# Patient Record
Sex: FEMALE | Employment: FULL TIME | URBAN - METROPOLITAN AREA
[De-identification: names, ages, dates, MRNs, and addresses within clinical notes are randomized per-mention and may not be internally consistent; named-entity substitution may affect disease eponyms.]

---

## 2017-01-30 ENCOUNTER — TELEPHONE (OUTPATIENT)
Dept: FAMILY MEDICINE CLINIC | Facility: CLINIC | Age: 22
End: 2017-01-30

## 2017-01-30 NOTE — TELEPHONE ENCOUNTER
Patient has reported that she attempted refill with her pharmacy, but they are unwilling to submit request on her behalf because med has no refills.     Current Outpatient Prescriptions:  Sertraline HCl 50 MG Oral Tab Take 1.5 tablets (75 mg total) by mouth

## 2017-02-17 NOTE — PROGRESS NOTES
Patient ID: Kavita Garcia is a 24year old female. HPI  Patient presents with: Anxiety  Depression    She states she is doing so much better with the 75 mg of sertraline. 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   No crying spells or suicidal ideat (around 8/17/2017).         Yair Doherty, DO  2/17/2017

## 2017-07-03 ENCOUNTER — OFFICE VISIT (OUTPATIENT)
Dept: FAMILY MEDICINE CLINIC | Facility: CLINIC | Age: 22
End: 2017-07-03

## 2017-07-03 VITALS
TEMPERATURE: 100 F | HEIGHT: 66 IN | HEART RATE: 88 BPM | SYSTOLIC BLOOD PRESSURE: 115 MMHG | WEIGHT: 132.19 LBS | RESPIRATION RATE: 12 BRPM | DIASTOLIC BLOOD PRESSURE: 75 MMHG | BODY MASS INDEX: 21.24 KG/M2

## 2017-07-03 DIAGNOSIS — N76.0 BACTERIAL VAGINOSIS: Primary | ICD-10-CM

## 2017-07-03 DIAGNOSIS — B96.89 BACTERIAL VAGINOSIS: Primary | ICD-10-CM

## 2017-07-03 PROCEDURE — 99213 OFFICE O/P EST LOW 20 MIN: CPT | Performed by: FAMILY MEDICINE

## 2017-07-03 PROCEDURE — 99212 OFFICE O/P EST SF 10 MIN: CPT | Performed by: FAMILY MEDICINE

## 2017-07-03 RX ORDER — SERTRALINE HYDROCHLORIDE 25 MG/1
50 TABLET, FILM COATED ORAL
COMMUNITY
End: 2018-01-22

## 2017-07-03 RX ORDER — METRONIDAZOLE 500 MG/1
500 TABLET ORAL 2 TIMES DAILY
Qty: 14 TABLET | Refills: 0 | Status: SHIPPED | OUTPATIENT
Start: 2017-07-03 | End: 2017-07-10

## 2017-07-03 NOTE — PROGRESS NOTES
Patient ID: Alba Pollock is a 24year old female. HPI  Patient presents with:  Vaginal Discharge: white and foul odor     She has had a history of bacterial vaginosis. She usually sees her gynecologist but they are closed today.   The last time she Constitutional: Patient is oriented to person, place, and time. Patient appears well-developed and well-nourished. HENT:   Mouth/Throat: Mucous membranes are normal.   Neck: Normal range of motion. Neck supple.  No thyromegaly   Cardiovascular: Normal r

## 2017-08-08 NOTE — TELEPHONE ENCOUNTER
Need refill on Sertraline HCl . Current Outpatient Prescriptions:  Sertraline HCl 25 MG Oral Tab Take 50 mg by mouth. Disp:  Rfl:    Sertraline HCl 50 MG Oral Tab Take 50 mg by mouth.  Disp:  Rfl:

## 2017-08-14 NOTE — TELEPHONE ENCOUNTER
Reviewed refill request with pharmacist, states pt is taking Sertraline 50 mg 1.5 tablets daily    Med listed as pt reported in EMR    Please advise    Refill Protocol Appointment Criteria  · Appointment scheduled in the past 6 months or in the next 3 judith

## 2018-01-11 NOTE — TELEPHONE ENCOUNTER
SUSAN- Please call and schedule an appointment for patient with Dr. Michelle Nava. See message below.

## 2018-01-22 NOTE — PROGRESS NOTES
Patient ID: Vic Cervantes is a 25year old female. HPI  Patient presents with:   Anxiety      She was doing well on 75 mg of sertraline in the past but states recently she even bumped it down to 50 mg since January 1, 2018 and is thinking of tapering currently breastfeeding. Physical Exam   Constitutional: Patient is oriented to person, place, and time. Patient appears well-developed and well-nourished. No distress. HENT:   Head: Normocephalic and atraumatic. Neck: Normal range of motion.  Neck sup

## 2018-05-07 ENCOUNTER — NURSE TRIAGE (OUTPATIENT)
Dept: OTHER | Age: 23
End: 2018-05-07

## 2018-05-07 NOTE — TELEPHONE ENCOUNTER
Action Requested: Summary for Provider     []  Critical Lab, Recommendations Needed  [] Need Additional Advice  []   FYI    []   Need Orders  [] Need Medications Sent to Pharmacy  []  Other     SUMMARY: Patient requesting appt tomorrow with VS only for \"b

## 2018-05-08 ENCOUNTER — OFFICE VISIT (OUTPATIENT)
Dept: FAMILY MEDICINE CLINIC | Facility: CLINIC | Age: 23
End: 2018-05-08

## 2018-05-08 VITALS
TEMPERATURE: 98 F | RESPIRATION RATE: 12 BRPM | WEIGHT: 134.63 LBS | BODY MASS INDEX: 21.64 KG/M2 | SYSTOLIC BLOOD PRESSURE: 120 MMHG | HEIGHT: 66 IN | HEART RATE: 83 BPM | DIASTOLIC BLOOD PRESSURE: 79 MMHG

## 2018-05-08 DIAGNOSIS — R23.8 GENERALIZED SKIN PAPULES: Primary | ICD-10-CM

## 2018-05-08 DIAGNOSIS — Z23 NEED FOR VACCINATION: ICD-10-CM

## 2018-05-08 PROCEDURE — 99213 OFFICE O/P EST LOW 20 MIN: CPT | Performed by: FAMILY MEDICINE

## 2018-05-08 PROCEDURE — 90471 IMMUNIZATION ADMIN: CPT | Performed by: FAMILY MEDICINE

## 2018-05-08 PROCEDURE — 90715 TDAP VACCINE 7 YRS/> IM: CPT | Performed by: FAMILY MEDICINE

## 2018-05-08 NOTE — PROGRESS NOTES
Patient ID: Tila Hardin is a 25year old female.     HPI  Patient presents with:  Bump: bilateral hands     Action Requested: Summary for Provider     []  Critical Lab, Recommendations Needed  [] Need Additional Advice  []   FYI    []   Need Orders  [] Encounters:  05/08/18 : 134 lb 9.6 oz (61.1 kg)  01/22/18 : 137 lb 12.8 oz (62.5 kg)  07/03/17 : 132 lb 3.2 oz (60 kg)  02/17/17 : 130 lb (59 kg)  12/29/16 : 131 lb (59.4 kg)  03/21/16 : 129 lb (58.5 kg)      Body mass index is 21.73 kg/m².     BP Readings INTERNAL  This does not look like molluscum contagiosum. It does not really bother her but we will try some triamcinolone to see if it helps.   I told her if it is not much better in 10-14 days she may want to see the dermatologist and I went ahead and did

## 2018-08-17 ENCOUNTER — HOSPITAL (OUTPATIENT)
Dept: OTHER | Age: 23
End: 2018-08-17
Attending: OBSTETRICS & GYNECOLOGY

## 2018-08-23 ENCOUNTER — HOSPITAL (OUTPATIENT)
Dept: OTHER | Age: 23
End: 2018-08-23
Attending: OBSTETRICS & GYNECOLOGY

## 2018-08-23 LAB — HCG SERPL-ACNC: 62 MUNIT/ML

## 2018-09-24 ENCOUNTER — TELEPHONE (OUTPATIENT)
Dept: OTHER | Age: 23
End: 2018-09-24

## 2018-09-24 NOTE — TELEPHONE ENCOUNTER
Spoke with patient--requesting name and # to dermatologist referred to her by VS.    Relayed information below--she states her insurance is currently [employer] PPO, but will be changing to [address].  Relayed to call # below for appt and to call back if appt is af Electronically Signed By: Akila Hughes DO    Order Date: May 8, 2018 at 10:17 AM

## 2018-11-05 ENCOUNTER — OFFICE VISIT (OUTPATIENT)
Dept: DERMATOLOGY CLINIC | Facility: CLINIC | Age: 23
End: 2018-11-05
Payer: COMMERCIAL

## 2018-11-05 DIAGNOSIS — L70.0 ACNE VULGARIS: ICD-10-CM

## 2018-11-05 DIAGNOSIS — B36.0 TINEA VERSICOLOR: Primary | ICD-10-CM

## 2018-11-05 PROCEDURE — 99212 OFFICE O/P EST SF 10 MIN: CPT | Performed by: DERMATOLOGY

## 2018-11-05 PROCEDURE — 99202 OFFICE O/P NEW SF 15 MIN: CPT | Performed by: DERMATOLOGY

## 2018-11-05 RX ORDER — CLOTRIMAZOLE 1 %
CREAM (GRAM) TOPICAL
Qty: 60 G | Refills: 3 | Status: ON HOLD | OUTPATIENT
Start: 2018-11-05 | End: 2019-06-15

## 2018-11-05 RX ORDER — CLINDAMYCIN PHOSPHATE 10 MG/G
1 GEL TOPICAL 2 TIMES DAILY
Qty: 60 G | Refills: 12 | Status: ON HOLD | OUTPATIENT
Start: 2018-11-05 | End: 2019-06-15

## 2018-11-05 RX ORDER — AZELAIC ACID 0.15 G/G
GEL TOPICAL
Qty: 50 G | Refills: 6 | Status: ON HOLD | OUTPATIENT
Start: 2018-11-05 | End: 2019-06-15

## 2018-11-18 NOTE — PROGRESS NOTES
Malcolm Tapia is a 21year old female. Patient presents with:  Acne: New pt presenting with acne to face, shoulder, and back. Pt states has previously used [employer] and spironolactone for treatment. Pt notes she is pregnant.              Patient has n (irritable bowel syndrome)    • Lactose intolerance      Past Surgical History:   Procedure Laterality Date   • OTHER SURGICAL HISTORY     • SIGMOIDOSCOPY, FLEXIBLE; W/DILATION, BALLOON, 1/> STRICTURES  2014   • UPPER GI ENDOSCOPY PERFORMED  2014     [facility] Sister    • Breast Cancer Other         Grandmother                      HPI :      Patient presents with:  Acne: New pt presenting with acne to face, shoulder, and back. Pt states has previously used [employer] and spironolactone for treatment.  Pt notes she Reassurance regarding benign skin lesions. Signs and symptoms of skin cancer, ABCDE's of melanoma briefly reviewed. Sunscreen use, sun protection, encouraged. Followup as noted in rtc or p.r.n.     No orders of the defined types were placed in this encount

## 2018-11-20 ENCOUNTER — LAB REQUISITION (OUTPATIENT)
Dept: LAB | Facility: HOSPITAL | Age: 23
End: 2018-11-20
Payer: COMMERCIAL

## 2018-11-20 DIAGNOSIS — Z34.91 ENCOUNTER FOR SUPERVISION OF NORMAL PREGNANCY IN FIRST TRIMESTER: ICD-10-CM

## 2018-11-20 PROCEDURE — 86780 TREPONEMA PALLIDUM: CPT | Performed by: OBSTETRICS & GYNECOLOGY

## 2018-11-20 PROCEDURE — 86762 RUBELLA ANTIBODY: CPT | Performed by: OBSTETRICS & GYNECOLOGY

## 2018-11-20 PROCEDURE — 87340 HEPATITIS B SURFACE AG IA: CPT | Performed by: OBSTETRICS & GYNECOLOGY

## 2018-11-20 PROCEDURE — 86850 RBC ANTIBODY SCREEN: CPT | Performed by: OBSTETRICS & GYNECOLOGY

## 2018-11-20 PROCEDURE — 87389 HIV-1 AG W/HIV-1&-2 AB AG IA: CPT | Performed by: OBSTETRICS & GYNECOLOGY

## 2018-11-20 PROCEDURE — 85025 COMPLETE CBC W/AUTO DIFF WBC: CPT | Performed by: OBSTETRICS & GYNECOLOGY

## 2018-11-20 PROCEDURE — 86900 BLOOD TYPING SEROLOGIC ABO: CPT | Performed by: OBSTETRICS & GYNECOLOGY

## 2018-11-20 PROCEDURE — 86901 BLOOD TYPING SEROLOGIC RH(D): CPT | Performed by: OBSTETRICS & GYNECOLOGY

## 2018-12-17 ENCOUNTER — LAB REQUISITION (OUTPATIENT)
Dept: LAB | Facility: HOSPITAL | Age: 23
End: 2018-12-17
Payer: COMMERCIAL

## 2018-12-17 DIAGNOSIS — Z34.01 ENCOUNTER FOR SUPERVISION OF NORMAL FIRST PREGNANCY IN FIRST TRIMESTER: ICD-10-CM

## 2018-12-17 PROCEDURE — 81220 CFTR GENE COM VARIANTS: CPT | Performed by: OBSTETRICS & GYNECOLOGY

## 2018-12-17 PROCEDURE — 81401 MOPATH PROCEDURE LEVEL 2: CPT | Performed by: OBSTETRICS & GYNECOLOGY

## 2018-12-26 ENCOUNTER — LAB REQUISITION (OUTPATIENT)
Dept: LAB | Facility: HOSPITAL | Age: 23
End: 2018-12-26
Payer: COMMERCIAL

## 2018-12-26 DIAGNOSIS — Z36.9 ENCOUNTER FOR ANTENATAL SCREENING: ICD-10-CM

## 2018-12-26 PROCEDURE — 87086 URINE CULTURE/COLONY COUNT: CPT | Performed by: OBSTETRICS & GYNECOLOGY

## 2018-12-26 PROCEDURE — 81001 URINALYSIS AUTO W/SCOPE: CPT | Performed by: OBSTETRICS & GYNECOLOGY

## 2019-01-05 ENCOUNTER — NURSE TRIAGE (OUTPATIENT)
Dept: FAMILY MEDICINE CLINIC | Facility: CLINIC | Age: 24
End: 2019-01-05

## 2019-01-05 NOTE — TELEPHONE ENCOUNTER
Action Requested: Summary for Provider     []  Critical Lab, Recommendations Needed  [] Need Additional Advice  []   FYI    []   Need Orders  [] Need Medications Sent to Pharmacy  []  Other     SUMMARY: Appt scheduled for Monday 1/7/19 4:30pm with belkis Carlos

## 2019-01-07 ENCOUNTER — OFFICE VISIT (OUTPATIENT)
Dept: FAMILY MEDICINE CLINIC | Facility: CLINIC | Age: 24
End: 2019-01-07

## 2019-01-07 VITALS
DIASTOLIC BLOOD PRESSURE: 68 MMHG | BODY MASS INDEX: 21.86 KG/M2 | TEMPERATURE: 99 F | HEIGHT: 66 IN | WEIGHT: 136 LBS | SYSTOLIC BLOOD PRESSURE: 122 MMHG | HEART RATE: 92 BPM

## 2019-01-07 DIAGNOSIS — Z3A.15 15 WEEKS GESTATION OF PREGNANCY: ICD-10-CM

## 2019-01-07 DIAGNOSIS — J06.9 UPPER RESPIRATORY TRACT INFECTION, UNSPECIFIED TYPE: Primary | ICD-10-CM

## 2019-01-07 PROCEDURE — 99213 OFFICE O/P EST LOW 20 MIN: CPT | Performed by: FAMILY MEDICINE

## 2019-01-07 PROCEDURE — 99212 OFFICE O/P EST SF 10 MIN: CPT | Performed by: FAMILY MEDICINE

## 2019-01-07 NOTE — PATIENT INSTRUCTIONS
Get over-the-counter Afrin nasal spray. Do two sprays in each nostril twice daily around breakfast and dinnertime. Avoid snorting it down the back of her throat. Do not take for more than 4 days.

## 2019-01-17 ENCOUNTER — TELEPHONE (OUTPATIENT)
Dept: FAMILY MEDICINE CLINIC | Facility: CLINIC | Age: 24
End: 2019-01-17

## 2019-01-17 RX ORDER — AMOXICILLIN 875 MG/1
875 TABLET, COATED ORAL 2 TIMES DAILY
Qty: 20 TABLET | Refills: 0 | Status: SHIPPED | OUTPATIENT
Start: 2019-01-17 | End: 2019-01-27

## 2019-01-17 NOTE — TELEPHONE ENCOUNTER
Patient reports seen in [address] [place] [address] Rd 1/7/19 with Dr Isadora Schmitz, with sinus symptoms, reports symptoms have not improved, continues to have nasal congestion, phlegm and coughing. Using Afrin with no improvement. Denied fever, sob/wheezing, no other symptoms at this time.  Laretta Runner

## 2019-01-18 NOTE — TELEPHONE ENCOUNTER
Let her know I sent in amoxicillin. Take it twice daily for 10 days which works wonderfully for sinus infections.

## 2019-01-24 ENCOUNTER — LAB REQUISITION (OUTPATIENT)
Dept: LAB | Facility: HOSPITAL | Age: 24
End: 2019-01-24
Payer: COMMERCIAL

## 2019-01-24 DIAGNOSIS — Z36.9 ENCOUNTER FOR ANTENATAL SCREENING: ICD-10-CM

## 2019-01-24 PROCEDURE — 82105 ALPHA-FETOPROTEIN SERUM: CPT | Performed by: OBSTETRICS & GYNECOLOGY

## 2019-01-26 LAB
FAMILY HX NEURAL TUBE DEFECT: NO
INSULIN REQ MATERNAL DIABETES: NO
MATERNAL AGE OF DELIVERY: 23.9 YR
MOM FOR AFP: 1.42
PATIENT'S AFP: 57 NG/ML

## 2019-03-18 ENCOUNTER — LAB REQUISITION (OUTPATIENT)
Dept: LAB | Facility: HOSPITAL | Age: 24
End: 2019-03-18
Payer: COMMERCIAL

## 2019-03-18 DIAGNOSIS — IMO0002 OTHER SPECIFIED DISEASES AND CONDITIONS COMPLICATING PREGNANCY, CHILDBIRTH AND THE PUERPERIUM: ICD-10-CM

## 2019-03-18 DIAGNOSIS — O26.899 OTHER SPECIFIED PREGNANCY RELATED CONDITIONS, UNSPECIFIED TRIMESTER: ICD-10-CM

## 2019-03-18 LAB
BACTERIA UR QL AUTO: NEGATIVE /HPF
BILIRUB UR QL: NEGATIVE
CLARITY UR: CLEAR
COLOR UR: YELLOW
GLUCOSE UR-MCNC: NEGATIVE MG/DL
HGB UR QL STRIP.AUTO: NEGATIVE
LEUKOCYTE ESTERASE UR QL STRIP.AUTO: NEGATIVE
NITRITE UR QL STRIP.AUTO: NEGATIVE
PH UR: 5 [PH] (ref 5–8)
PROT UR-MCNC: NEGATIVE MG/DL
RBC #/AREA URNS AUTO: <1 /HPF
SP GR UR STRIP: 1.03 (ref 1–1.03)
UROBILINOGEN UR STRIP-ACNC: <2
VIT C UR-MCNC: 40 MG/DL
WBC #/AREA URNS AUTO: 0 /HPF

## 2019-03-18 PROCEDURE — 87086 URINE CULTURE/COLONY COUNT: CPT | Performed by: OBSTETRICS & GYNECOLOGY

## 2019-03-18 PROCEDURE — 81003 URINALYSIS AUTO W/O SCOPE: CPT | Performed by: OBSTETRICS & GYNECOLOGY

## 2019-04-08 ENCOUNTER — LAB REQUISITION (OUTPATIENT)
Dept: LAB | Facility: HOSPITAL | Age: 24
End: 2019-04-08
Payer: COMMERCIAL

## 2019-04-08 DIAGNOSIS — Z13.0 ENCOUNTER FOR SCREENING FOR DISEASES OF THE BLOOD AND BLOOD-FORMING ORGANS AND CERTAIN DISORDERS INVOLVING THE IMMUNE MECHANISM: ICD-10-CM

## 2019-04-08 DIAGNOSIS — Z13.1 ENCOUNTER FOR SCREENING FOR DIABETES MELLITUS: ICD-10-CM

## 2019-04-08 PROCEDURE — 80053 COMPREHEN METABOLIC PANEL: CPT | Performed by: OBSTETRICS & GYNECOLOGY

## 2019-04-08 PROCEDURE — 85025 COMPLETE CBC W/AUTO DIFF WBC: CPT | Performed by: OBSTETRICS & GYNECOLOGY

## 2019-04-08 PROCEDURE — 82950 GLUCOSE TEST: CPT | Performed by: OBSTETRICS & GYNECOLOGY

## 2019-04-17 ENCOUNTER — HOSPITAL ENCOUNTER (OUTPATIENT)
Dept: ULTRASOUND IMAGING | Age: 24
Discharge: HOME | End: 2019-04-17
Attending: OBSTETRICS & GYNECOLOGY
Payer: COMMERCIAL

## 2019-04-17 DIAGNOSIS — R10.11 RUQ ABDOMINAL PAIN: ICD-10-CM

## 2019-04-17 PROCEDURE — 76705 ECHO EXAM OF ABDOMEN: CPT | Performed by: OBSTETRICS & GYNECOLOGY

## 2019-06-05 ENCOUNTER — LAB REQUISITION (OUTPATIENT)
Dept: LAB | Facility: HOSPITAL | Age: 24
End: 2019-06-05
Payer: COMMERCIAL

## 2019-06-05 DIAGNOSIS — Z36.85 ENCOUNTER FOR ANTENATAL SCREENING FOR STREPTOCOCCUS B: ICD-10-CM

## 2019-06-05 DIAGNOSIS — Z36.9 ENCOUNTER FOR ANTENATAL SCREENING: ICD-10-CM

## 2019-06-05 PROCEDURE — 86780 TREPONEMA PALLIDUM: CPT | Performed by: OBSTETRICS & GYNECOLOGY

## 2019-06-05 PROCEDURE — 87081 CULTURE SCREEN ONLY: CPT | Performed by: OBSTETRICS & GYNECOLOGY

## 2019-06-05 PROCEDURE — 87653 STREP B DNA AMP PROBE: CPT | Performed by: OBSTETRICS & GYNECOLOGY

## 2019-06-05 PROCEDURE — 87389 HIV-1 AG W/HIV-1&-2 AB AG IA: CPT | Performed by: OBSTETRICS & GYNECOLOGY

## 2019-06-13 ENCOUNTER — ANESTHESIA (OUTPATIENT)
Dept: OBGYN UNIT | Facility: HOSPITAL | Age: 24
End: 2019-06-13
Payer: COMMERCIAL

## 2019-06-13 ENCOUNTER — HOSPITAL ENCOUNTER (INPATIENT)
Facility: HOSPITAL | Age: 24
LOS: 2 days | Discharge: HOME | End: 2019-06-15
Attending: OBSTETRICS & GYNECOLOGY | Admitting: OBSTETRICS & GYNECOLOGY
Payer: COMMERCIAL

## 2019-06-13 ENCOUNTER — ANESTHESIA EVENT (OUTPATIENT)
Dept: OBGYN UNIT | Facility: HOSPITAL | Age: 24
End: 2019-06-13
Payer: COMMERCIAL

## 2019-06-13 PROBLEM — Z86.59 HISTORY OF ANXIETY: Status: ACTIVE | Noted: 2019-06-13

## 2019-06-13 PROBLEM — O26.93 PREGNANCY RELATED CONDITION IN THIRD TRIMESTER: Status: ACTIVE | Noted: 2019-06-13

## 2019-06-13 PROBLEM — O26.93 PREGNANCY RELATED CONDITION IN THIRD TRIMESTER: Status: RESOLVED | Noted: 2019-06-13 | Resolved: 2019-06-13

## 2019-06-13 PROCEDURE — 86900 BLOOD TYPING SEROLOGIC ABO: CPT | Performed by: OBSTETRICS & GYNECOLOGY

## 2019-06-13 PROCEDURE — 85027 COMPLETE CBC AUTOMATED: CPT | Performed by: OBSTETRICS & GYNECOLOGY

## 2019-06-13 PROCEDURE — 0HQ9XZZ REPAIR PERINEUM SKIN, EXTERNAL APPROACH: ICD-10-PCS | Performed by: OBSTETRICS & GYNECOLOGY

## 2019-06-13 PROCEDURE — 86850 RBC ANTIBODY SCREEN: CPT | Performed by: OBSTETRICS & GYNECOLOGY

## 2019-06-13 PROCEDURE — 86901 BLOOD TYPING SEROLOGIC RH(D): CPT | Performed by: OBSTETRICS & GYNECOLOGY

## 2019-06-13 RX ORDER — ONDANSETRON 2 MG/ML
4 INJECTION INTRAMUSCULAR; INTRAVENOUS EVERY 6 HOURS PRN
Status: DISCONTINUED | OUTPATIENT
Start: 2019-06-13 | End: 2019-06-15

## 2019-06-13 RX ORDER — BUPIVACAINE HYDROCHLORIDE 2.5 MG/ML
INJECTION, SOLUTION EPIDURAL; INFILTRATION; INTRACAUDAL AS NEEDED
Status: DISCONTINUED | OUTPATIENT
Start: 2019-06-13 | End: 2019-06-13 | Stop reason: SURG

## 2019-06-13 RX ORDER — BUPIVACAINE HYDROCHLORIDE 2.5 MG/ML
INJECTION, SOLUTION EPIDURAL; INFILTRATION; INTRACAUDAL
Status: DISPENSED
Start: 2019-06-13 | End: 2019-06-13

## 2019-06-13 RX ORDER — LIDOCAINE HYDROCHLORIDE 10 MG/ML
INJECTION, SOLUTION EPIDURAL; INFILTRATION; INTRACAUDAL; PERINEURAL AS NEEDED
Status: DISCONTINUED | OUTPATIENT
Start: 2019-06-13 | End: 2019-06-13 | Stop reason: SURG

## 2019-06-13 RX ORDER — DIAPER,BRIEF,INFANT-TODD,DISP
1 EACH MISCELLANEOUS EVERY 6 HOURS PRN
Status: DISCONTINUED | OUTPATIENT
Start: 2019-06-13 | End: 2019-06-15

## 2019-06-13 RX ORDER — EPHEDRINE SULFATE/0.9% NACL/PF 25 MG/5 ML
SYRINGE (ML) INTRAVENOUS
Status: COMPLETED
Start: 2019-06-13 | End: 2019-06-13

## 2019-06-13 RX ORDER — BUPIVACAINE HYDROCHLORIDE 2.5 MG/ML
30 INJECTION, SOLUTION EPIDURAL; INFILTRATION; INTRACAUDAL ONCE
Status: DISCONTINUED | OUTPATIENT
Start: 2019-06-13 | End: 2019-06-13

## 2019-06-13 RX ORDER — AMMONIA INHALANTS 0.04 G/.3ML
0.3 INHALANT RESPIRATORY (INHALATION) AS NEEDED
Status: DISCONTINUED | OUTPATIENT
Start: 2019-06-13 | End: 2019-06-15

## 2019-06-13 RX ORDER — IBUPROFEN 600 MG/1
600 TABLET ORAL EVERY 4 HOURS PRN
Status: DISCONTINUED | OUTPATIENT
Start: 2019-06-13 | End: 2019-06-15

## 2019-06-13 RX ORDER — IBUPROFEN 400 MG/1
400 TABLET ORAL EVERY 4 HOURS PRN
Status: DISCONTINUED | OUTPATIENT
Start: 2019-06-13 | End: 2019-06-15

## 2019-06-13 RX ORDER — SODIUM CHLORIDE, SODIUM LACTATE, POTASSIUM CHLORIDE, CALCIUM CHLORIDE 600; 310; 30; 20 MG/100ML; MG/100ML; MG/100ML; MG/100ML
INJECTION, SOLUTION INTRAVENOUS CONTINUOUS
Status: DISCONTINUED | OUTPATIENT
Start: 2019-06-13 | End: 2019-06-13

## 2019-06-13 RX ORDER — CHOLECALCIFEROL (VITAMIN D3) 25 MCG
1 TABLET,CHEWABLE ORAL DAILY
Status: DISCONTINUED | OUTPATIENT
Start: 2019-06-13 | End: 2019-06-15

## 2019-06-13 RX ORDER — NALBUPHINE HCL 10 MG/ML
2.5 AMPUL (ML) INJECTION
Status: DISCONTINUED | OUTPATIENT
Start: 2019-06-13 | End: 2019-06-13

## 2019-06-13 RX ORDER — BISACODYL 10 MG
10 SUPPOSITORY, RECTAL RECTAL ONCE AS NEEDED
Status: DISCONTINUED | OUTPATIENT
Start: 2019-06-13 | End: 2019-06-15

## 2019-06-13 RX ORDER — LIDOCAINE HYDROCHLORIDE 10 MG/ML
30 INJECTION, SOLUTION EPIDURAL; INFILTRATION; INTRACAUDAL; PERINEURAL ONCE
Status: DISCONTINUED | OUTPATIENT
Start: 2019-06-13 | End: 2019-06-13

## 2019-06-13 RX ORDER — TERBUTALINE SULFATE 1 MG/ML
0.25 INJECTION, SOLUTION SUBCUTANEOUS AS NEEDED
Status: DISCONTINUED | OUTPATIENT
Start: 2019-06-13 | End: 2019-06-13

## 2019-06-13 RX ORDER — DOCUSATE SODIUM 100 MG/1
100 CAPSULE, LIQUID FILLED ORAL 2 TIMES DAILY
Status: DISCONTINUED | OUTPATIENT
Start: 2019-06-13 | End: 2019-06-15

## 2019-06-13 RX ORDER — LIDOCAINE HYDROCHLORIDE AND EPINEPHRINE 15; 5 MG/ML; UG/ML
INJECTION, SOLUTION EPIDURAL AS NEEDED
Status: DISCONTINUED | OUTPATIENT
Start: 2019-06-13 | End: 2019-06-13 | Stop reason: SURG

## 2019-06-13 RX ORDER — LIDOCAINE HYDROCHLORIDE AND EPINEPHRINE 20; 5 MG/ML; UG/ML
20 INJECTION, SOLUTION EPIDURAL; INFILTRATION; INTRACAUDAL; PERINEURAL ONCE
Status: COMPLETED | OUTPATIENT
Start: 2019-06-13 | End: 2019-06-13

## 2019-06-13 RX ORDER — DEXTROSE, SODIUM CHLORIDE, SODIUM LACTATE, POTASSIUM CHLORIDE, AND CALCIUM CHLORIDE 5; .6; .31; .03; .02 G/100ML; G/100ML; G/100ML; G/100ML; G/100ML
INJECTION, SOLUTION INTRAVENOUS CONTINUOUS
Status: DISCONTINUED | OUTPATIENT
Start: 2019-06-13 | End: 2019-06-13

## 2019-06-13 RX ORDER — LIDOCAINE HYDROCHLORIDE AND EPINEPHRINE 20; 5 MG/ML; UG/ML
INJECTION, SOLUTION EPIDURAL; INFILTRATION; INTRACAUDAL; PERINEURAL
Status: COMPLETED
Start: 2019-06-13 | End: 2019-06-13

## 2019-06-13 RX ORDER — NALBUPHINE HCL 10 MG/ML
10 AMPUL (ML) INJECTION
Status: DISCONTINUED | OUTPATIENT
Start: 2019-06-13 | End: 2019-06-13

## 2019-06-13 RX ORDER — IBUPROFEN 600 MG/1
600 TABLET ORAL ONCE AS NEEDED
Status: DISCONTINUED | OUTPATIENT
Start: 2019-06-13 | End: 2019-06-13

## 2019-06-13 RX ORDER — SIMETHICONE 80 MG
80 TABLET,CHEWABLE ORAL 3 TIMES DAILY PRN
Status: DISCONTINUED | OUTPATIENT
Start: 2019-06-13 | End: 2019-06-15

## 2019-06-13 RX ORDER — SODIUM CHLORIDE 0.9 % (FLUSH) 0.9 %
10 SYRINGE (ML) INJECTION AS NEEDED
Status: DISCONTINUED | OUTPATIENT
Start: 2019-06-13 | End: 2019-06-15

## 2019-06-13 RX ORDER — ONDANSETRON 2 MG/ML
4 INJECTION INTRAMUSCULAR; INTRAVENOUS EVERY 6 HOURS PRN
Status: DISCONTINUED | OUTPATIENT
Start: 2019-06-13 | End: 2019-06-13

## 2019-06-13 RX ORDER — EPHEDRINE SULFATE/0.9% NACL/PF 25 MG/5 ML
5 SYRINGE (ML) INTRAVENOUS AS NEEDED
Status: DISCONTINUED | OUTPATIENT
Start: 2019-06-13 | End: 2019-06-13

## 2019-06-13 RX ORDER — TRISODIUM CITRATE DIHYDRATE AND CITRIC ACID MONOHYDRATE 500; 334 MG/5ML; MG/5ML
30 SOLUTION ORAL AS NEEDED
Status: DISCONTINUED | OUTPATIENT
Start: 2019-06-13 | End: 2019-06-13

## 2019-06-13 RX ORDER — IBUPROFEN 400 MG/1
200 TABLET ORAL EVERY 4 HOURS PRN
Status: DISCONTINUED | OUTPATIENT
Start: 2019-06-13 | End: 2019-06-15

## 2019-06-13 RX ORDER — ACETAMINOPHEN 500 MG
500 TABLET ORAL EVERY 6 HOURS PRN
Status: DISCONTINUED | OUTPATIENT
Start: 2019-06-13 | End: 2019-06-15

## 2019-06-13 RX ORDER — SODIUM CHLORIDE 0.9 % (FLUSH) 0.9 %
10 SYRINGE (ML) INJECTION AS NEEDED
Status: DISCONTINUED | OUTPATIENT
Start: 2019-06-13 | End: 2019-06-13

## 2019-06-13 RX ORDER — AMMONIA INHALANTS 0.04 G/.3ML
0.3 INHALANT RESPIRATORY (INHALATION) AS NEEDED
Status: DISCONTINUED | OUTPATIENT
Start: 2019-06-13 | End: 2019-06-13

## 2019-06-13 RX ADMIN — BUPIVACAINE HYDROCHLORIDE 2 MG: 2.5 INJECTION, SOLUTION EPIDURAL; INFILTRATION; INTRACAUDAL at 10:37:00

## 2019-06-13 RX ADMIN — LIDOCAINE HYDROCHLORIDE 3 ML: 10 INJECTION, SOLUTION EPIDURAL; INFILTRATION; INTRACAUDAL; PERINEURAL at 10:32:00

## 2019-06-13 RX ADMIN — LIDOCAINE HYDROCHLORIDE AND EPINEPHRINE 3 ML: 15; 5 INJECTION, SOLUTION EPIDURAL at 10:38:00

## 2019-06-13 NOTE — ANESTHESIA PREPROCEDURE EVALUATION
Anesthesia PreOp Note    HPI:     Kareen Mane is a 21year old female who presents for preoperative consultation requested by: * No surgeons listed *    Date of Surgery: 6/13/2019    * No procedures listed *  Indication: * No pre-op diagnosis entered * ringers infusion  Intravenous Continuous Loan Waldrop MD     dextrose 5 % /lactated ringers infusion  Intravenous Continuous Loan Waldrop MD     Lidocaine HCl (PF) (XYLOCAINE) 1 % injection SOLN 30 mL 30 mL Intradermal Once Loan Waldrop MD     ibu PRN Mckenna , DO 3 mL at 06/13/19 1038    Bupivacaine HCl (PF) (MARCAINE) 0.25 % injection   PRN Mckenna , DO 2 mg at 06/13/19 1037    fentaNYL citrate (SUBLIMAZE) 0.05 MG/ML injection  Intrathecal PRN Mckenna , DO 15 mcg at 06/13/19 1037 Fear of current or ex partner: Not on file        Emotionally abused: Not on file        Physically abused: Not on file        Forced sexual activity: Not on file    Other Topics      Concerns:         Service: Not Asked        Blood Transfusion Dental - normal exam     Pulmonary - negative ROS and normal exam   Cardiovascular - negative ROS and normal exam    Neuro/Psych      GI/Hepatic/Renal      Endo/Other - negative ROS   Abdominal                Anesthesia Plan:   ASA:  1  Plan:   Epidural  P

## 2019-06-13 NOTE — H&P
[facility] Patient Status:  Inpatient    1995 MRN [id number]   Location [address]  Attending Tiera Grijalva MD   Hosp Day # 0 PCP Jaqueline Stephens DO     Date of Adm Application topically 2 (two) times daily.  Use bid as directed Disp: 60 g Rfl: 12 Past Week at Unknown time   clotrimazole 1 % External Cream Use bid Disp: 60 g Rfl: 3 Taking   triamcinolone acetonide 0.1 % External Cream Apply topically 2 (two) times tomas MD  6/13/2019  5:44 AM

## 2019-06-13 NOTE — PROGRESS NOTES
Per RN 5/80/-1 and requesting epidural  FHTs 130s, mod variability, accels, occas variable w UC  Epidural ok.

## 2019-06-13 NOTE — ANESTHESIA POSTPROCEDURE EVALUATION
Patient: Melanie Germain    Procedure Summary     Date:  06/13/19 Room / Location:      Anesthesia Start:  8949 Anesthesia Stop:  1250    Procedure:  LABOR ANALGESIA Diagnosis:      Scheduled Providers:   Anesthesiologist:  DO Vidya Sykes

## 2019-06-13 NOTE — PROGRESS NOTES
[facility]    Labor Progress Note    Irving Woody Patient Status:  Inpatient    1995 MRN [id number]   Location [address]  Attending Clarissa Larios MD   [facility] Day # 0 PCP DO Samuel Daley

## 2019-06-13 NOTE — L&D DELIVERY NOTE
[facility]OSP - [facility]    Vaginal Delivery Note    New [place] Patient Status:  Inpatient    1995 MRN [id number]   Location [address] Attending Aylin Washington MD   Hosp Day # 0 PCP Aaron Jose DO     Delivery

## 2019-06-13 NOTE — ANESTHESIA PROCEDURE NOTES
Labor Analgesia  Performed by: Edwin Pena DO  Authorized by: Edwin Pena DO     Patient Location:  OB  Start Time:  6/13/2019 10:25 AM  End Time:  6/13/2019 10:38 AM  Reason for Block: labor epidural    Anesthesiologist:  Edwin Pena DO  Performtremayne

## 2019-06-13 NOTE — DISCHARGE SUMMARY
[facility] Patient Status:  Inpatient    1995 MRN [id number]   Location [address] [place] [address] Attending Lilian Campa MD   [facility] Day # 0       Admission Date: 2019  Dis

## 2019-06-13 NOTE — PROGRESS NOTES
Pt is a 21year old female admitted to [place] [address]. Patient presents with:  Srom: @0114      Pt is  Unknown intra-uterine pregnancy. History obtained, consents signed. Oriented to room, staff, and plan of care.

## 2019-06-13 NOTE — PLAN OF CARE
Problem: Patient Centered Care  Goal: Patient preferences are identified and integrated in the patient's plan of care  Description  Interventions:  - What would you like us to know as we care for you?   - Provide timely, complete, and accurate informatio provide assistance as needed. - Assess and monitor emotional status and provide social service/psych resources as needed. - Utilize standard precautions and use personal protective equipment as indicated.  Ensure aseptic care of all intravenous lines and community breast feeding support.    6/13/2019 1624 by Kaila Gaytan RN  Outcome: Progressing  6/13/2019 1623 by Kaila Gaytan, RN  Outcome: Progressing  Goal: Establishment of adequate milk supply with medication/procedure interruptions  Descrip

## 2019-06-14 PROCEDURE — 85014 HEMATOCRIT: CPT | Performed by: OBSTETRICS & GYNECOLOGY

## 2019-06-14 PROCEDURE — 85018 HEMOGLOBIN: CPT | Performed by: OBSTETRICS & GYNECOLOGY

## 2019-06-14 NOTE — PROGRESS NOTES
[facility] - [facility]    OB/GYNE Progress Note      Greg Jang Patient Status:  Inpatient    1995 MRN [id number]   Location [facility] 3SE Attending Jhoana Gonsalves MD   [facility] Day # 1 PCP Bubba Engle DO       Assessment/Plan   Eva Mireles

## 2019-06-14 NOTE — PLAN OF CARE
Problem: Patient Centered Care  Goal: Patient preferences are identified and integrated in the patient's plan of care  Description  Interventions:  - What would you like us to know as we care for you?  First baby  - Provide timely, complete, and accurate exposure to communicable diseases history. Outcome: Progressing  Goal: Optimize infant feeding at the breast  Description  INTERVENTIONS:  - Initiate breast feeding within first hour after birth.    - Monitor effectiveness of current breast feeding efforts bonding  Description  INTERVENTIONS:  - Assess caregiver- interactions. - Assess caregiver's emotional status and coping mechanisms. - Encourage caregiver to participate in  daily care. - Assess support systems available to mother/family.

## 2019-06-15 VITALS
DIASTOLIC BLOOD PRESSURE: 59 MMHG | SYSTOLIC BLOOD PRESSURE: 113 MMHG | HEART RATE: 78 BPM | RESPIRATION RATE: 14 BRPM | TEMPERATURE: 99 F | OXYGEN SATURATION: 100 %

## 2019-06-15 NOTE — PLAN OF CARE
Problem: Patient Centered Care  Goal: Patient preferences are identified and integrated in the patient's plan of care  Description  Interventions:  - What would you like us to know as we care for you?   - Provide timely, complete, and accurate informatio breast  Description  INTERVENTIONS:  - Initiate breast feeding within first hour after birth. - Monitor effectiveness of current breast feeding efforts.   - Assess support systems available to mother/family.  - Identify cultural beliefs/practices regardin mechanisms. - Encourage caregiver to participate in  daily care. - Assess support systems available to mother/family.  - Provide /case management support as needed.   Outcome: Completed

## 2019-06-15 NOTE — PROGRESS NOTES
[facility] - [facility]    OB/GYNE Progress Note      Paige Reeves Patient Status:  Inpatient    1995 MRN [id number]   Location [facility] 3SE Attending Nikki Greenberg MD   [address] Day # 2 PCP Lorri Sun, DO       Assessment/Plan   Jose Guadalupe Friends

## 2019-09-09 ENCOUNTER — NURSE TRIAGE (OUTPATIENT)
Dept: FAMILY MEDICINE CLINIC | Facility: CLINIC | Age: 24
End: 2019-09-09

## 2019-09-09 ENCOUNTER — OFFICE VISIT (OUTPATIENT)
Dept: FAMILY MEDICINE CLINIC | Facility: CLINIC | Age: 24
End: 2019-09-09

## 2019-09-09 VITALS
DIASTOLIC BLOOD PRESSURE: 75 MMHG | BODY MASS INDEX: 21.86 KG/M2 | TEMPERATURE: 99 F | SYSTOLIC BLOOD PRESSURE: 113 MMHG | HEART RATE: 95 BPM | WEIGHT: 136 LBS | HEIGHT: 66 IN

## 2019-09-09 DIAGNOSIS — R19.7 DIARRHEA, UNSPECIFIED TYPE: ICD-10-CM

## 2019-09-09 DIAGNOSIS — K13.0 LESION OF LIP: Primary | ICD-10-CM

## 2019-09-09 PROCEDURE — 99214 OFFICE O/P EST MOD 30 MIN: CPT | Performed by: FAMILY MEDICINE

## 2019-09-09 RX ORDER — CLINDAMYCIN PHOSPHATE 10 MG/ML
LOTION TOPICAL 2 TIMES DAILY
COMMUNITY

## 2019-09-09 NOTE — PROGRESS NOTES
Patient ID: Ana Carson is a 25year old female. HPI  Patient presents with:  Mouth/Lip Problem: lip lesions on inside of bottom lip  Diarrhea: x 4 days    The lip lesions on the inside of the bottom lip that she noticed this morning. No travel. temperature 99.1 °F (37.3 °C), temperature source Oral, height 5' 6\" (1.676 m), weight 136 lb (61.7 kg), currently breastfeeding. Physical Exam   Constitutional: Patient is oriented to person, place, and time.  Patient appears well-developed and well-nour DO  9/9/2019

## 2019-09-09 NOTE — TELEPHONE ENCOUNTER
Action Requested: Summary for Provider     []  Critical Lab, Recommendations Needed  [] Need Additional Advice  []   FYI    []   Need Orders  [] Need Medications Sent to Pharmacy  []  Other     SUMMARY: Scheduled for Dr Con Good today 9/9/19 at 2:20 pm J[place]

## 2019-09-27 ENCOUNTER — OFFICE VISIT (OUTPATIENT)
Dept: FAMILY MEDICINE CLINIC | Facility: CLINIC | Age: 24
End: 2019-09-27

## 2019-09-27 ENCOUNTER — NURSE TRIAGE (OUTPATIENT)
Dept: FAMILY MEDICINE CLINIC | Facility: CLINIC | Age: 24
End: 2019-09-27

## 2019-09-27 VITALS
BODY MASS INDEX: 21.86 KG/M2 | SYSTOLIC BLOOD PRESSURE: 121 MMHG | HEART RATE: 95 BPM | WEIGHT: 136 LBS | DIASTOLIC BLOOD PRESSURE: 72 MMHG | HEIGHT: 66 IN

## 2019-09-27 DIAGNOSIS — K12.0 APHTHOUS ULCER: ICD-10-CM

## 2019-09-27 DIAGNOSIS — K12.1 ORAL ULCERATION: Primary | ICD-10-CM

## 2019-09-27 PROCEDURE — 99213 OFFICE O/P EST LOW 20 MIN: CPT | Performed by: NURSE PRACTITIONER

## 2019-09-27 NOTE — PATIENT INSTRUCTIONS
Canker Sore    A canker sore (also called an aphthous ulcer) is a painful sore on the lining of the mouth. It is most painful during the first few days, and it lasts about 7 to 14 days before going away.   Causes  Canker sores are not cold sores or fever · Don’t eat crusty or crunchy foods such as [employer] bread or potato chips. These kinds of foods can hurt the inside of your mouth, or scrape your existing canker sores. Medicines  You can try over-the-counter medicines that cover the sores and numb them.  Cameron Muñoz

## 2019-09-27 NOTE — TELEPHONE ENCOUNTER
Action Requested: Summary for Provider     []  Critical Lab, Recommendations Needed  [] Need Additional Advice  []   FYI    []   Need Orders  [] Need Medications Sent to Pharmacy  []  Other     SUMMARY: Per protocol advised : OV today or tomorrow ; booked

## 2019-09-27 NOTE — ASSESSMENT & PLAN NOTE
Magic mouthwash  Low acid diet  Please call if symptoms worsen or are not resolving-will then refer to ENT

## 2019-09-27 NOTE — PROGRESS NOTES
HPI  Pt here for f/u on lip lesions. Color has improved so it is not as red but now has more on side of bottom lip and inner cheek. Denies pain to lesions  Has h/o celiac-did have soy sauce at a [employer] 3 weeks ago.     Was brushing teeth and on Inability: Not on file      Transportation needs:        Medical: Not on file        Non-medical: Not on file    Tobacco Use      Smoking status: Never Smoker      Smokeless tobacco: Never Used    Substance and Sexual Activity      Alcohol use: No      [place] Allergies:    Gluten Meal             DIARRHEA, NAUSEA AND VOMITING    Physical Exam   Nursing note and vitals reviewed. Constitutional: She is oriented to person, place, and time. She appears well-developed and well-nourished.    HENT:   Few flesh

## 2019-09-28 ENCOUNTER — TELEPHONE (OUTPATIENT)
Dept: FAMILY MEDICINE CLINIC | Facility: CLINIC | Age: 24
End: 2019-09-28

## 2019-09-28 DIAGNOSIS — K13.79 OTHER LESIONS OF ORAL MUCOSA: Primary | ICD-10-CM

## 2019-09-28 NOTE — TELEPHONE ENCOUNTER
Patient states OV on 9/27 with KAMILA Herbert for oral ulceration and prescribed Benadryl/lidocaine/mylanta oral wash, but states pharmacy does not carry it and her insurance would not pay for it as well. Patient asking for alternative.

## 2019-10-14 ENCOUNTER — OFFICE VISIT (OUTPATIENT)
Dept: OTOLARYNGOLOGY | Facility: CLINIC | Age: 24
End: 2019-10-14

## 2019-10-14 VITALS — BODY MASS INDEX: 21.05 KG/M2 | HEIGHT: 66 IN | WEIGHT: 131 LBS

## 2019-10-14 DIAGNOSIS — K12.0 APHTHOUS ULCER: Primary | ICD-10-CM

## 2019-10-14 PROCEDURE — 99243 OFF/OP CNSLTJ NEW/EST LOW 30: CPT | Performed by: OTOLARYNGOLOGY

## 2019-10-14 RX ORDER — ACETAMINOPHEN AND CODEINE PHOSPHATE 120; 12 MG/5ML; MG/5ML
SOLUTION ORAL
COMMUNITY
Start: 2019-07-26

## 2019-10-14 NOTE — PROGRESS NOTES
Melanie Germain is a 25year old female. Patient presents with:  Mouth Lesions: sores in mouth, no pain, on side of cheek , for past 2 months, lesion on side of mouth increasing in size     HPI:   She feels some small bumps inside her lower lip.   Is not pa Detail Normal Submental. Submandibular. Anterior cervical. Posterior cervical. Supraclavicular.    Eyes Normal Conjunctiva - Right: Normal, Left: Normal. Pupil - Right: Normal, Left: Normal.    Ears Normal Inspection - Right: Normal, Left: Normal. Canal - L

## 2020-09-17 ENCOUNTER — LAB REQUISITION (OUTPATIENT)
Dept: LAB | Facility: HOSPITAL | Age: 25
End: 2020-09-17
Payer: COMMERCIAL

## 2020-09-17 DIAGNOSIS — Z01.419 ENCOUNTER FOR GYNECOLOGICAL EXAMINATION (GENERAL) (ROUTINE) WITHOUT ABNORMAL FINDINGS: ICD-10-CM

## 2020-09-17 PROCEDURE — 88175 CYTOPATH C/V AUTO FLUID REDO: CPT | Performed by: OBSTETRICS & GYNECOLOGY

## 2020-09-17 PROCEDURE — 87624 HPV HI-RISK TYP POOLED RSLT: CPT | Performed by: OBSTETRICS & GYNECOLOGY

## 2020-09-18 LAB — HPV I/H RISK 1 DNA SPEC QL NAA+PROBE: NEGATIVE

## 2020-10-01 ENCOUNTER — OFFICE VISIT (OUTPATIENT)
Dept: FAMILY MEDICINE CLINIC | Facility: CLINIC | Age: 25
End: 2020-10-01

## 2020-10-01 VITALS
WEIGHT: 123 LBS | HEIGHT: 66 IN | HEART RATE: 95 BPM | SYSTOLIC BLOOD PRESSURE: 116 MMHG | DIASTOLIC BLOOD PRESSURE: 73 MMHG | BODY MASS INDEX: 19.77 KG/M2

## 2020-10-01 DIAGNOSIS — G89.29 CHRONIC RIGHT-SIDED HEADACHE: ICD-10-CM

## 2020-10-01 DIAGNOSIS — M62.838 TRAPEZIUS MUSCLE SPASM: ICD-10-CM

## 2020-10-01 DIAGNOSIS — G44.209 TENSION HEADACHE: ICD-10-CM

## 2020-10-01 DIAGNOSIS — R63.4 WEIGHT LOSS, UNINTENTIONAL: ICD-10-CM

## 2020-10-01 DIAGNOSIS — S16.1XXA STRAIN OF NECK MUSCLE, INITIAL ENCOUNTER: ICD-10-CM

## 2020-10-01 DIAGNOSIS — R51.9 RETRO-OCULAR HEADACHE: Primary | ICD-10-CM

## 2020-10-01 DIAGNOSIS — R51.9 CHRONIC RIGHT-SIDED HEADACHE: ICD-10-CM

## 2020-10-01 DIAGNOSIS — M62.838 NECK MUSCLE SPASM: ICD-10-CM

## 2020-10-01 PROCEDURE — 3074F SYST BP LT 130 MM HG: CPT | Performed by: FAMILY MEDICINE

## 2020-10-01 PROCEDURE — 99214 OFFICE O/P EST MOD 30 MIN: CPT | Performed by: FAMILY MEDICINE

## 2020-10-01 PROCEDURE — 3078F DIAST BP <80 MM HG: CPT | Performed by: FAMILY MEDICINE

## 2020-10-01 PROCEDURE — 3008F BODY MASS INDEX DOCD: CPT | Performed by: FAMILY MEDICINE

## 2020-10-01 NOTE — PROGRESS NOTES
Patient ID: Melanie Germain is a 22year old female. HPI  Patient presents with:  Migraine  Weight Loss: unplanned    Last physical on 03/21/2016. Pt c/o deep, pounding headaches for the past 15 months.  The pain is localized behind her R eye and eye LYMABS 2.90 04/08/2019    MOABSO 0.55 04/08/2019    EOABSO 0.19 04/08/2019    BAABSO 0.05 04/08/2019       Lab Results   Component Value Date     (H) 04/08/2019    BUN 10 04/08/2019    BUNCREA 15.2 04/08/2019    CREATSERUM 0.66 04/08/2019    ANIO (59.4 kg)  09/27/19 : 136 lb (61.7 kg)  09/09/19 : 136 lb (61.7 kg)  01/07/19 : 136 lb (61.7 kg)  05/08/18 : 134 lb 9.6 oz (61.1 kg)              BMI Readings from Last 6 Encounters:  10/01/20 : 19.85 kg/m²  10/14/19 : 21.14 kg/m²  09/27/19 : 21.95 kg/m² Minutes per session: Not on file      Stress: Not on file    Relationships      Social connections        Talks on phone: Not on file        Gets together: Not on file        Attends Buddhism service: Not on file        Active member of club or [place] papilledema. Neck: Normal range of motion. Neck supple. No thyromegaly present. Has tenderness bilateral  trapezius up into cervical perispinal muscles and into scalp. does have increased tone. Right transverse process is more prominent posteriorly.   Card Number of Visits Requested:3      Physical Therapy ([facility]) - [place] Locations          Order Comments:              Treatment: Evaluate & Treat and use Modalities if needed               Physician goals: Pain relief, Increased Function, Activities of daily

## 2020-11-17 ENCOUNTER — OFFICE VISIT (OUTPATIENT)
Dept: NEUROLOGY | Facility: CLINIC | Age: 25
End: 2020-11-17

## 2020-11-17 VITALS — BODY MASS INDEX: 19.29 KG/M2 | WEIGHT: 120 LBS | HEIGHT: 66 IN

## 2020-11-17 DIAGNOSIS — R51.9 NEW ONSET HEADACHE: Primary | ICD-10-CM

## 2020-11-17 PROCEDURE — 3008F BODY MASS INDEX DOCD: CPT | Performed by: OTHER

## 2020-11-17 PROCEDURE — 99204 OFFICE O/P NEW MOD 45 MIN: CPT | Performed by: OTHER

## 2020-11-17 RX ORDER — SUMATRIPTAN 100 MG/1
TABLET, FILM COATED ORAL
Qty: 9 TABLET | Refills: 0 | Status: SHIPPED | OUTPATIENT
Start: 2020-11-17 | End: 2020-12-24

## 2020-11-17 NOTE — PATIENT INSTRUCTIONS
Migraine headache  Introduction  Migraines are extremely painful, recurring headaches that are sometimes accompanied by other symptoms, such as visual disturbances, for example, seeing an aura or nausea.  There are 2 types of migraine:  · Migraine with au blood flow to the brain. At first, blood vessels narrow or constrict, reducing blood flow and leading to visual disturbances, difficulty speaking, weakness, numbness, or tingling sensation in one area of the body, or other similar symptoms.  Later, the bloo Diagnosis  Your doctor will take a detailed medical history so he or she can determine whether you have a migraine or another kind of headache, such as a tension or sinus headache.  Your doctor will ask questions about when your headaches occur, how long identify the triggers for your headaches so you can avoid them. When a migraine happens, write down the date and time it started.  Note what you ate for the preceding 24 hours, how long you slept the night before, what you were doing just before the headach · Amitriptyline (Elavil)   · Nortriptyline (Pamelor)   · Doxepin (Sinequan)   · Imipramine (Tofranil)   Anticonvulsants.  Some antiseizure drugs help prevent migraines, although researchers are not sure why:   · Divalproex sodium (Depakote)   · Gabapentin as some prescription migraine medications at reducing the intensity and frequency of attacks. But not all studies agree.  One study found that 5-HTP was less effective than the beta-blocker Inderal. More studies are needed to be sure that 5-HTP is helpful i sure:  · Coenzyme Q10 (CoQ10). CoQ10 can interact with several medications including blood thinners, such as warfarin (Coumadin), some cancer medications, and medications for high blood pressure. · Melatonin.  Melatonin can interact with a number of medic and should not be taken with blood thinners, such as warfarin (Coumadin) or clopidogrel (Plavix). Feverfew can potentially interact with a number of medications. Speak with your physician. Women who are pregnant or breastfeeding should not take feverfew.  I allow people with migraines to take less pain medication. More research is needed. Practitioners believe reflexology helps you become more aware of your own body signals, which might help you sense an oncoming migraine, before pain starts.  They also believ

## 2020-11-17 NOTE — PROGRESS NOTES
Neurology Initial Visit     Referred By: Dr. Tia Fletcher    Chief Complaint: Patient presents with:  Headache: Patient presents today c/o right sided headaches that have beengoing  on  for many years.  She feels they have  been getting worst  and sheis getting Grandmother         Current Outpatient Medications:   •  SUMAtriptan Succinate 100 MG Oral Tab, Use at onset; repeat once after 2 HRS-ONLY 2 IN 24 HR MAX. This is a 30 day supply. , Disp: 9 tablet, Rfl: 0  •  Norethindrone 0.35 MG Oral Tab, , Disp: Component Value Date    BUN 10 04/08/2019    CA 8.4 (L) 04/08/2019    ALT 24 04/08/2019    AST 17 04/08/2019    ALKPHOS 50 05/20/2014    ALB 3.2 (L) 04/08/2019     04/08/2019    K 3.3 (L) 04/08/2019     04/08/2019    CO2 23.0 04/08/2019

## 2020-11-24 ENCOUNTER — HOSPITAL ENCOUNTER (OUTPATIENT)
Dept: MRI IMAGING | Age: 25
Discharge: HOME | End: 2020-11-24
Attending: Other
Payer: COMMERCIAL

## 2020-11-24 ENCOUNTER — TELEPHONE (OUTPATIENT)
Dept: FAMILY MEDICINE CLINIC | Facility: CLINIC | Age: 25
End: 2020-11-24

## 2020-11-24 ENCOUNTER — TELEPHONE (OUTPATIENT)
Dept: NEUROLOGY | Facility: CLINIC | Age: 25
End: 2020-11-24

## 2020-11-24 DIAGNOSIS — R51.9 NEW ONSET HEADACHE: ICD-10-CM

## 2020-11-24 DIAGNOSIS — Z00.00 WELL ADULT EXAM: Primary | ICD-10-CM

## 2020-11-24 PROCEDURE — A9575 INJ GADOTERATE MEGLUMI 0.1ML: HCPCS | Performed by: OTHER

## 2020-11-24 PROCEDURE — 70553 MRI BRAIN STEM W/O & W/DYE: CPT | Performed by: OTHER

## 2020-11-24 NOTE — TELEPHONE ENCOUNTER
----- Message from Kendrick Peoples MD sent at 11/24/2020 12:58 PM CST -----  Please let patient know that MRI brain didn't show significant abnormalities.

## 2020-11-24 NOTE — TELEPHONE ENCOUNTER
Spoke with patient ( verified) and relayed Vilma Dean message below and scheduling # for lab appt--to fast 12 hours prior to completing labs--may drink water, black coffiee and may take medication--patient verbalizes understanding and agreement.  No

## 2020-11-24 NOTE — TELEPHONE ENCOUNTER
Spoke with patient ( verified)--did complete MRI brain--results within normal limits. Patient saw Dr. Dawn Urban in office 10/01/2020. Patient still breast feeding--has not yet started Sumatriptan as ordered by Dr. Kia Poe.     Patient asking for orders

## 2020-12-01 ENCOUNTER — LAB ENCOUNTER (OUTPATIENT)
Dept: LAB | Age: 25
End: 2020-12-01
Attending: NURSE PRACTITIONER
Payer: COMMERCIAL

## 2020-12-01 DIAGNOSIS — Z00.00 WELL ADULT EXAM: ICD-10-CM

## 2020-12-01 PROCEDURE — 85027 COMPLETE CBC AUTOMATED: CPT

## 2020-12-01 PROCEDURE — 84443 ASSAY THYROID STIM HORMONE: CPT

## 2020-12-01 PROCEDURE — 82306 VITAMIN D 25 HYDROXY: CPT

## 2020-12-01 PROCEDURE — 82607 VITAMIN B-12: CPT

## 2020-12-01 PROCEDURE — 80061 LIPID PANEL: CPT

## 2020-12-01 PROCEDURE — 36415 COLL VENOUS BLD VENIPUNCTURE: CPT

## 2020-12-01 PROCEDURE — 80053 COMPREHEN METABOLIC PANEL: CPT

## 2020-12-24 RX ORDER — SUMATRIPTAN 100 MG/1
TABLET, FILM COATED ORAL
Qty: 9 TABLET | Refills: 5 | Status: SHIPPED | OUTPATIENT
Start: 2020-12-24

## 2020-12-24 NOTE — TELEPHONE ENCOUNTER
Refill request for sumatriptan 100 mg, take 1 tab at onset of migraine, #9, 5 refills    LOV: 11/17/20  NOV: none

## 2021-04-24 ENCOUNTER — TELEPHONE (OUTPATIENT)
Dept: FAMILY MEDICINE CLINIC | Facility: CLINIC | Age: 26
End: 2021-04-24

## 2021-05-31 ENCOUNTER — TELEMEDICINE (OUTPATIENT)
Dept: FAMILY MEDICINE CLINIC | Facility: CLINIC | Age: 26
End: 2021-05-31

## 2021-05-31 DIAGNOSIS — R21 RASH OF TOE: Primary | ICD-10-CM

## 2021-05-31 PROCEDURE — 99212 OFFICE O/P EST SF 10 MIN: CPT | Performed by: FAMILY MEDICINE

## 2021-05-31 NOTE — PROGRESS NOTES
VIDEO VISIT PROGRESS NOTE  Todays date: 5/31/2021 4:12 PM        Most recent Nurse Triage message / Vivienne Collins message from patient:      From: Nir Trejo  To:  Zohra Peacock DO  Sent: 5/31/2021 10:09 AM CDT  Subject: Non-Urgent Medical Question    Leigh Clark and would rather have a designated person speak for them and in that case we will be speaking to that designated person and all parties involved understand the disclaimer that goes along with the consent for the video check-in service as stated above. well-developed and well-nourished. No distress. Patient was responding to questions appropriately. Patient did not sound short of breath. Patient has a normal mood and affect.   She was walking and pushing her daughter in a stroller when we did the video SUMAtriptan Succinate 100 MG Oral Tab TAKE 1 TABLET BY MOUTH AT ONSET REPEAT ONCE AFTER 2 HOURS-ONLY 2 IN 24 HR MAX. THIS IS A 30 DAY SUPPLY.  9 tablet 5   • Norethindrone 0.35 MG Oral Tab      • Clindamycin Phosphate 1 % External Lotion Apply topically 2 (

## 2021-10-15 ENCOUNTER — LAB REQUISITION (OUTPATIENT)
Dept: LAB | Facility: HOSPITAL | Age: 26
End: 2021-10-15
Payer: COMMERCIAL

## 2021-10-15 DIAGNOSIS — Z34.01 ENCOUNTER FOR SUPERVISION OF NORMAL FIRST PREGNANCY, FIRST TRIMESTER: ICD-10-CM

## 2021-10-15 DIAGNOSIS — Z34.81 ENCOUNTER FOR SUPERVISION OF OTHER NORMAL PREGNANCY, FIRST TRIMESTER: ICD-10-CM

## 2021-10-15 DIAGNOSIS — Z11.3 ENCOUNTER FOR SCREENING FOR INFECTIONS WITH A PREDOMINANTLY SEXUAL MODE OF TRANSMISSION: ICD-10-CM

## 2021-10-15 PROCEDURE — 87389 HIV-1 AG W/HIV-1&-2 AB AG IA: CPT | Performed by: OBSTETRICS & GYNECOLOGY

## 2021-10-15 PROCEDURE — 86900 BLOOD TYPING SEROLOGIC ABO: CPT | Performed by: OBSTETRICS & GYNECOLOGY

## 2021-10-15 PROCEDURE — 86803 HEPATITIS C AB TEST: CPT | Performed by: OBSTETRICS & GYNECOLOGY

## 2021-10-15 PROCEDURE — 85025 COMPLETE CBC W/AUTO DIFF WBC: CPT | Performed by: OBSTETRICS & GYNECOLOGY

## 2021-10-15 PROCEDURE — 87340 HEPATITIS B SURFACE AG IA: CPT | Performed by: OBSTETRICS & GYNECOLOGY

## 2021-10-15 PROCEDURE — 86901 BLOOD TYPING SEROLOGIC RH(D): CPT | Performed by: OBSTETRICS & GYNECOLOGY

## 2021-10-15 PROCEDURE — 86780 TREPONEMA PALLIDUM: CPT | Performed by: OBSTETRICS & GYNECOLOGY

## 2021-10-15 PROCEDURE — 86850 RBC ANTIBODY SCREEN: CPT | Performed by: OBSTETRICS & GYNECOLOGY

## 2021-10-15 PROCEDURE — 86787 VARICELLA-ZOSTER ANTIBODY: CPT | Performed by: OBSTETRICS & GYNECOLOGY

## 2021-10-15 PROCEDURE — 86762 RUBELLA ANTIBODY: CPT | Performed by: OBSTETRICS & GYNECOLOGY

## 2021-11-08 ENCOUNTER — LAB REQUISITION (OUTPATIENT)
Dept: LAB | Facility: HOSPITAL | Age: 26
End: 2021-11-08
Payer: COMMERCIAL

## 2021-11-08 DIAGNOSIS — Z36.9 ENCOUNTER FOR ANTENATAL SCREENING, UNSPECIFIED: ICD-10-CM

## 2021-11-08 PROCEDURE — 81003 URINALYSIS AUTO W/O SCOPE: CPT | Performed by: OBSTETRICS & GYNECOLOGY

## 2021-11-08 PROCEDURE — 87086 URINE CULTURE/COLONY COUNT: CPT | Performed by: OBSTETRICS & GYNECOLOGY

## 2021-12-07 ENCOUNTER — LAB REQUISITION (OUTPATIENT)
Dept: LAB | Facility: HOSPITAL | Age: 26
End: 2021-12-07
Payer: COMMERCIAL

## 2021-12-07 DIAGNOSIS — Z36.82 ENCOUNTER FOR ANTENATAL SCREENING FOR NUCHAL TRANSLUCENCY: ICD-10-CM

## 2021-12-07 PROCEDURE — 82105 ALPHA-FETOPROTEIN SERUM: CPT | Performed by: OBSTETRICS & GYNECOLOGY

## 2021-12-23 ENCOUNTER — NURSE TRIAGE (OUTPATIENT)
Dept: FAMILY MEDICINE CLINIC | Facility: CLINIC | Age: 26
End: 2021-12-23

## 2021-12-23 NOTE — TELEPHONE ENCOUNTER
Action Requested: Summary for Provider     []  Critical Lab, Recommendations Needed  [] Need Additional Advice  []   FYI    []   Need Orders  [] Need Medications Sent to Pharmacy  []  Other     SUMMARY: Patient will monitor symptoms and possibly go to UC o

## 2021-12-24 NOTE — TELEPHONE ENCOUNTER
Patient following up, re-tested for Covid today and was positive. Symptoms started on Tues but today feeling better, has mild sore throat, no fever or any other symptoms. Advised continue isolation for 10 days, reviewed guidelines for ending isolation.

## 2022-02-24 ENCOUNTER — LAB REQUISITION (OUTPATIENT)
Dept: LAB | Facility: HOSPITAL | Age: 27
End: 2022-02-24
Payer: COMMERCIAL

## 2022-02-24 LAB
BASOPHILS # BLD AUTO: 0.03 X10(3) UL (ref 0–0.2)
BASOPHILS NFR BLD AUTO: 0.3 %
DEPRECATED RDW RBC AUTO: 46.6 FL (ref 35.1–46.3)
EOSINOPHIL # BLD AUTO: 0.28 X10(3) UL (ref 0–0.7)
EOSINOPHIL NFR BLD AUTO: 3.2 %
ERYTHROCYTE [DISTWIDTH] IN BLOOD BY AUTOMATED COUNT: 12.5 % (ref 11–15)
GLUCOSE 1H P GLC SERPL-MCNC: 141 MG/DL
HCT VFR BLD AUTO: 36.8 %
HGB BLD-MCNC: 12.1 G/DL
IMM GRANULOCYTES # BLD AUTO: 0.08 X10(3) UL (ref 0–1)
IMM GRANULOCYTES NFR BLD: 0.9 %
LYMPHOCYTES # BLD AUTO: 1.87 X10(3) UL (ref 1–4)
LYMPHOCYTES NFR BLD AUTO: 21.5 %
MCH RBC QN AUTO: 33.2 PG (ref 26–34)
MCHC RBC AUTO-ENTMCNC: 32.9 G/DL (ref 31–37)
MCV RBC AUTO: 101.1 FL
MONOCYTES # BLD AUTO: 0.47 X10(3) UL (ref 0.1–1)
MONOCYTES NFR BLD AUTO: 5.4 %
NEUTROPHILS # BLD AUTO: 5.98 X10 (3) UL (ref 1.5–7.7)
NEUTROPHILS # BLD AUTO: 5.98 X10(3) UL (ref 1.5–7.7)
NEUTROPHILS NFR BLD AUTO: 68.7 %
PLATELET # BLD AUTO: 239 10(3)UL (ref 150–450)
RBC # BLD AUTO: 3.64 X10(6)UL
WBC # BLD AUTO: 8.7 X10(3) UL (ref 4–11)

## 2022-02-24 PROCEDURE — 82950 GLUCOSE TEST: CPT | Performed by: OBSTETRICS & GYNECOLOGY

## 2022-02-24 PROCEDURE — 85025 COMPLETE CBC W/AUTO DIFF WBC: CPT | Performed by: OBSTETRICS & GYNECOLOGY

## 2022-02-26 ENCOUNTER — LAB REQUISITION (OUTPATIENT)
Dept: LAB | Facility: HOSPITAL | Age: 27
End: 2022-02-26
Payer: COMMERCIAL

## 2022-02-26 LAB
GLUCOSE 1H P GLC SERPL-MCNC: 114 MG/DL
GLUCOSE 2H P GLC SERPL-MCNC: 144 MG/DL
GLUCOSE 3H P GLC SERPL-MCNC: 115 MG/DL (ref 70–140)
GLUCOSE P FAST SERPL-MCNC: 73 MG/DL

## 2022-02-26 PROCEDURE — 82952 GTT-ADDED SAMPLES: CPT | Performed by: OBSTETRICS & GYNECOLOGY

## 2022-02-26 PROCEDURE — 82951 GLUCOSE TOLERANCE TEST (GTT): CPT | Performed by: OBSTETRICS & GYNECOLOGY

## 2022-03-13 ENCOUNTER — HOSPITAL ENCOUNTER (OUTPATIENT)
Facility: HOSPITAL | Age: 27
Discharge: HOME | End: 2022-03-13
Attending: OBSTETRICS & GYNECOLOGY | Admitting: OBSTETRICS & GYNECOLOGY
Payer: COMMERCIAL

## 2022-03-13 VITALS — DIASTOLIC BLOOD PRESSURE: 73 MMHG | SYSTOLIC BLOOD PRESSURE: 113 MMHG | HEART RATE: 90 BPM

## 2022-03-13 PROCEDURE — 99212 OFFICE O/P EST SF 10 MIN: CPT

## 2022-03-13 PROCEDURE — 59025 FETAL NON-STRESS TEST: CPT

## 2022-03-13 RX ORDER — PRENATAL VIT/IRON FUM/FOLIC AC 27MG-0.8MG
1 TABLET ORAL DAILY
COMMUNITY

## 2022-03-13 NOTE — PROGRESS NOTES
Pt is a 32year old female admitted to [facility]. Patient presents with:  Decreased Fetal Movement     Pt is  30w2d intra-uterine pregnancy. History obtained, consents signed. Oriented to room, staff, and plan of care.

## 2022-03-13 NOTE — TRIAGE
[facility] HOSP - [facility]      Triage Note    [facility] Patient Status:  Outpatient    1995 MRN [id number]   Location [address] Attending Alvin [place] Day # 0 PCP [place] [facility] [place] [facility]: [id number]  Estimated Date of Delivery: 22  Gestation: 30w2d    Chief Complaint     Decreased Fetal Movement          Allergies:    Gluten Meal             DIARRHEA, NAUSEA AND VOMITING    No orders of the defined types were placed in this encounter.       Lab Results   Component Value Date    WBC 8.7 2022    HGB 12.1 2022    HCT 36.8 2022    .0 2022    CREATSERUM 0.80 2020    BUN 16 2020     2020    K 4.0 2020     2020    CO2 29.0 2020    GLU 77 2020    CA 9.2 2020    ALB 4.2 2020    ALKPHO 69 2020    BILT 0.7 2020    TP 7.5 2020    AST 13 (L) 2020    ALT 15 2020    INR 1.1 03/15/2010    TSH 1.910 2020    B12 709 2020       Clinitek UA  Lab Results   Component Value Date    GLUUR Negative 2021    SPECGRAVITY 1.017 2021    URINECUL No Growth at 18-24 hrs. 2021       UA  Lab Results   Component Value Date    COLORUR Yellow 2021    CLARITY Cloudy (A) 2021    SPECGRAVITY 1.017 2021    PROUR Negative 2021    GLUUR Negative 2021    KETUR Negative 2021    BILUR Negative 2021    BLOODURINE Negative 2021    NITRITE Negative 2021    UROBILINOGEN <2.0 2021    LEUUR Negative 2021    UASA 40  (A) 2019  1226   BP: 113/73   Pulse: 90       NST  Variability: Moderate           Accelerations: Yes           Decelerations: None            Baseline: 140 BPM           Uterine Irritability: No           Contractions: Not present                                        Acoustic Stimulator: No           Nonstress Test Interpretation: Appropriate for gestational age           Nonstress Test Second Interpretation: Appropriate for gestational age          FHR Category: Category I             Additional Comments Comments: Dr. Alisa Ferrell notifed of reactive tracing, +FM, pt feeling more comfortable. Orders for discharge recieved. Reviewed kick counts with pt, education given. Pt home amb accompanied by SO.      Patient presents with:  Decreased Fetal Movement        Karlo Bran RN  3/13/2022 1:10 PM  Physician Evaluation      NST Interpretation: Reactive    Disposition:   Discharged    Comments:    Pt feeling NL FM during NST    Thomas Sousa

## 2022-04-25 ENCOUNTER — LAB REQUISITION (OUTPATIENT)
Dept: LAB | Facility: HOSPITAL | Age: 27
End: 2022-04-25
Payer: COMMERCIAL

## 2022-04-25 PROCEDURE — 87389 HIV-1 AG W/HIV-1&-2 AB AG IA: CPT | Performed by: OBSTETRICS & GYNECOLOGY

## 2022-04-25 PROCEDURE — 87081 CULTURE SCREEN ONLY: CPT | Performed by: OBSTETRICS & GYNECOLOGY

## 2022-04-25 PROCEDURE — 86780 TREPONEMA PALLIDUM: CPT | Performed by: OBSTETRICS & GYNECOLOGY

## 2022-04-25 PROCEDURE — 87653 STREP B DNA AMP PROBE: CPT | Performed by: OBSTETRICS & GYNECOLOGY

## 2022-04-27 LAB
GROUP B STREP BY PCR FOR PCR OVT: NEGATIVE
T PALLIDUM AB SER QL: NEGATIVE

## 2022-05-08 NOTE — PROGRESS NOTES
Pt is a 32year old female admitted to TR1/TR1-A. Patient presents with:  External Version: pt. here for external cephalic version     Pt is  38w2d intra-uterine pregnancy. History obtained, pt. Oriented to room, staff, and plan of care.

## 2022-05-08 NOTE — TRIAGE
MD Triage Addendum  27yo G 3  @ 38.2 wks w breech presentation, mild polyhydramnios. Here for external cephalic version. R&Bs reviewed w pt. Bedside US: Breech, high normal fluid, nl alessia, no previa  NST: reactive. UC: mild 3-7 min    Pt received terbutaline 0.25 mg subcutaneous    Single attempt, ECV successful. NL FHTs throughout and after. Tolerated well. NST s/p procedure: 150, mod variability, 1-2 brief variable decels, accels, Reactive. UC: irritability    Dx: Breech, s/p ECV    Disp: Home.  RTO 2d as scheduled    Tasha Jacobsen MD  5/8/2022 9:22 AM

## 2022-05-12 NOTE — PLAN OF CARE
Problem: BIRTH - VAGINAL/ SECTION  Goal: Fetal and maternal status remain reassuring during the birth process  Description: INTERVENTIONS:  - Monitor vital signs  - Monitor fetal heart rate  - Monitor uterine activity  - Monitor labor progression (vaginal delivery)  - DVT prophylaxis (C/S delivery)  - Surgical antibiotic prophylaxis (C/S delivery)  Outcome: Completed     Problem: PAIN - ADULT  Goal: Verbalizes/displays adequate comfort level or patient's stated pain goal  Description: INTERVENTIONS:  - Encourage pt to monitor pain and request assistance  - Assess pain using appropriate pain scale  - Administer analgesics based on type and severity of pain and evaluate response  - Implement non-pharmacological measures as appropriate and evaluate response  - Consider cultural and social influences on pain and pain management  - Manage/alleviate anxiety  - Utilize distraction and/or relaxation techniques  - Monitor for opioid side effects  - Notify MD/LIP if interventions unsuccessful or patient reports new pain  - Anticipate increased pain with activity and pre-medicate as appropriate  Outcome: Progressing     Problem: ANXIETY  Goal: Will report anxiety at manageable levels  Description: INTERVENTIONS:  - Administer medication as ordered  - Teach and rehearse alternative coping skills  - Provide emotional support with 1:1 interaction with staff  Outcome: Progressing     Problem: POSTPARTUM  Goal: Long Term Goal:Experiences normal postpartum course  Description: INTERVENTIONS:  - Assess and monitor vital signs and lab values. - Assess fundus and lochia. - Provide ice/sitz baths for perineum discomfort. - Monitor healing of incision/episiotomy/laceration, and assess for signs and symptoms of infection and hematoma. - Assess bladder function and monitor for bladder distention.  - Provide/instruct/assist with pericare as needed. - Provide VTE prophylaxis as needed.   - Monitor bowel function.  - Encourage ambulation and provide assistance as needed. - Assess and monitor emotional status and provide social service/psych resources as needed. - Utilize standard precautions and use personal protective equipment as indicated. Ensure aseptic care of all intravenous lines and invasive tubes/drains.  - Obtain immunization and exposure to communicable diseases history. Outcome: Progressing  Goal: Optimize infant feeding at the breast  Description: INTERVENTIONS:  - Initiate breast feeding within first hour after birth. - Monitor effectiveness of current breast feeding efforts. - Assess support systems available to mother/family.  - Identify cultural beliefs/practices regarding lactation, letdown techniques, maternal food preferences. - Assess mother's knowledge and previous experience with breast feeding.  - Provide information as needed about early infant feeding cues (e.g., rooting, lip smacking, sucking fingers/hand) versus late cue of crying.  - Discuss/demonstrate breast feeding aids (e.g., infant sling, nursing footstool/pillows, and breast pumps). - Encourage mother/other family members to express feelings/concerns, and actively listen. - Educate father/SO about benefits of breast feeding and how to manage common lactation challenges. - Recommend avoidance of specific medications or substances incompatible with breast feeding.  - Assess and monitor for signs of nipple pain/trauma. - Instruct and provide assistance with proper latch. - Review techniques for milk expression (breast pumping) and storage of breast milk. Provide pumping equipment/supplies, instructions and assistance, as needed. - Encourage rooming-in and breast feeding on demand.  - Encourage skin-to-skin contact. - Provide LC support as needed. - Assess for and manage engorgement. - Provide breast feeding education handouts and information on community breast feeding support.    Outcome: Progressing  Goal: Establishment of adequate milk supply with medication/procedure interruptions  Description: INTERVENTIONS:  - Review techniques for milk expression (breast pumping). - Provide pumping equipment/supplies, instructions, and assistance until it is safe to breastfeed infant. Outcome: Progressing  Goal: Appropriate maternal -  bonding  Description: INTERVENTIONS:  - Assess caregiver- interactions. - Assess caregiver's emotional status and coping mechanisms. - Encourage caregiver to participate in  daily care. - Assess support systems available to mother/family.  - Provide /case management support as needed.   Outcome: Progressing

## 2022-05-12 NOTE — ANESTHESIA PROCEDURE NOTES
Labor Analgesia    Date/Time: 5/12/2022 10:01 AM  Performed by: General Ryanne MD  Authorized by: General Ryanne MD       General Information and Staff    Start Time:  5/12/2022 9:51 AM  End Time:  5/12/2022 10:04 AM  Anesthesiologist:  General Ryanne MD  Performed by:   Anesthesiologist  Site Identification: surface landmarks    Reason for Block: labor epidural    Preanesthetic Checklist: patient identified, IV checked, risks and benefits discussed, monitors and equipment checked, pre-op evaluation, timeout performed, anesthesia consent and sterile technique used      Procedure Details    Patient Position:  Sitting  Prep: ChloraPrep and patient draped    Monitoring:  Heart rate, cardiac monitor and continuous pulse ox  Approach:  Midline    Epidural Needle    Injection Technique:  LATASHA saline  Needle Type:  Tuohy  Needle Gauge:  18 G  Needle Length:  3.375 in  Needle Insertion Depth:  4  Location:  L3-4    Spinal Needle      Catheter    Catheter Type:  Multi-orifice  Catheter Size:  20 G  Catheter at Skin Depth:  9  Test Dose:  Negative    Assessment  Sensory Level:  T8    Additional Comments

## 2022-05-12 NOTE — PROGRESS NOTES
Pt is a 32year old female admitted to TR2/TR2-A. Patient presents with:  R/o Labor: felt leaking today at 4:53, c/o frequnt painful contrctions  Rupture Of Membranes     Pt is  38w6d intra-uterine pregnancy. History obtained, consents signed. Oriented to room, staff, and plan of care.

## 2022-05-12 NOTE — DISCHARGE SUMMARY
[facility]    Discharge Summary    Steffen Bond Patient Status:  Inpatient    1995 MRN [id number]   Location [address] [place] [address] Attending Concepcion Ortega MD   [facility] Day # 0       Admission Date: 2022  Discharge Date:   2022    Delivering OB Clinician: Sang Chavez MD MD    [facility]: Estimated Date of Delivery: 22    Gestational Age: 38w7d    Antepartum complications:      Anxiety     H/o Breech presentation s/p external version      Intrapartum Complications: None    Date of Delivery: 2022 Time of Delivery: 11:28 AM    Delivery Type: spontaneous vaginal delivery    Laceration: 1st degree and vaginal    Baby: Liveborn female Information for the patient's : Clarence Reynoso, Girl [[id number]]   7 lb 5.8 oz (3.34 kg)  Apgars:  1 minute: 9  5 minutes: 9    Baby Name: Akiko Mancilla     Circumcision: [address] [place] [address] Course: Spontaneous labor. Epidural. AROM. No complications.  Routine delivery and postpartum care        Recent Labs:        Discharged Condition: stable      Disposition: home      Plan:     Follow-up appointment in 6 weeks   with Dr. Lynnette Soto MD, MD  2022  11:46 AM

## 2022-05-12 NOTE — LACTATION NOTE
LACTATION NOTE - MOTHER      Evaluation Type: Inpatient    Problems identified  Problems identified: Knowledge deficit    Maternal history  Other/comment: IBS    Breastfeeding goal  Breastfeeding goal: To maintain breast milk feeding per patient goal    Maternal Assessment  Bilateral Breasts: Symmetrical;Soft  Bilateral Nipples: WNL; Everted  Prior breastfeeding experience (comment below): Multip  Prior BF experience: comment: 18 mos  Breastfeeding Assistance: Breastfeeding assistance provided with permission         Guidelines for use of: Other (comment): Infant is sleepy. Hand expressed and spoon fed.  Colostrum is easily expressed 2.41

## 2022-05-12 NOTE — L&D DELIVERY NOTE
[facility]    Vaginal Delivery Note    Mirian Parra Patient Status:  Inpatient    1995 MRN [id number]   Location [address] Attending Watson Candelario MD   [facility] Day # 0 PCP Jose Jackson DO     Delivery     Infant  Date of Delivery: 2022  Time of Delivery: 11:28 AM  Delivery Type: Normal spontaneous vaginal delivery    Infant Sex: Infant Birthweight: Presentation:  Position:   F  7 lbs 6 oz  Vtx   OA    Apgars:  1 minute: 9  5 minutes: 9              Name: Brody Marin   Circumcision desired: n/a    Placenta  Delivery: spontaneous  Placenta to Pathology: yes due to h/o COVID  Cord Gases Submitted: no  Cord Blood/Tissue Collection: no  Cord Complications: none  Sponge and Needle Counts:  Verified    Maternal Anesthesia: epidural   Episiotomy/Laceration Repair: 1st degree perineal - no repair, right vaginal - 3-0 Vicryl repair     Delivery Complications: none    Neonatologist Present: no  Quantitative Blood Loss (mL) pending  EBL: 200 mL     Yomi Chávez MD   2022 11:43 AM

## 2022-05-12 NOTE — PROGRESS NOTES
Patient up to bathroom with assist x 2. Voided 400cc at this time. Patient transferred to mother/baby room 354 per wheelchair in stable condition with baby and personal belongings. Accompanied by significant other and staff. Report given to Maria Victoria Cueto, mother/baby RN.

## 2022-05-13 NOTE — LACTATION NOTE
LACTATION NOTE - MOTHER      Evaluation Type: Inpatient         Maternal history  Maternal history: Anxiety  Other/comment: IBS, GERD, celiac    Breastfeeding goal  Breastfeeding goal: To maintain breast milk feeding per patient goal    Maternal Assessment  Bilateral Breasts: Soft  Bilateral Nipples: Everted  Prior breastfeeding experience (comment below): Multip  Prior BF experience: comment: 18 mos  Breastfeeding Assistance: Breastfeeding assistance provided with permission    Pain assessment  Location/Comment: denies soreness    Guidelines for use of:  Breast pump type: Ameda Tete  Other (comment): Infant latched deeply to breast, observed end of feeding. Mom reports infant has been latching and nursing well, confident with feeds.  Reviewed breastfeeding education, outpatient LC contact info given, encouraged to call as needed

## 2022-05-13 NOTE — LACTATION NOTE
This note was copied from a baby's chart. LACTATION NOTE - INFANT    Evaluation Type  Evaluation Type: Inpatient    Problems & Assessment  Infant Assessment: Hunger cues present;Skin color: pink or appropriate for ethnicity  Muscle tone: Appropriate for GA    Feeding Assessment  Summary Current Feeding: Adlib;Breastfeeding exclusively  Breastfeeding Assessment: Assisted with breastfeeding w/mother's permission;Calm and ready to breastfeed;Coordinated suck/swallow; Tolerated feeding well  Breastfeeding Positions: left breast;cradle  Latch: Grasps breast, tongue down, lips flanged, rhythmic sucking  Audible Sucks/Swallows: A few with stimulation  Type of Nipple: Everted (after stimulation)  Comfort (Breast/Nipple): Soft/non-tender  Hold (Positioning): Full assist, teach one side, mother does other, staff holds  [facility] Score: 8  Other (comment): observed end of feeding, infant latched deeply, encouraged mom to unswaddle infant and reviewed ways to keep infant stimulated at breast    Output  # Voids in 24 hours: 4  # Stools in 24 hours: 4                 Infant latched deeply to breast, observed end of feeding. Mom reports infant has been latching and nursing well, confident with feeds.  Reviewed breastfeeding education, outpatient LC contact info given, encouraged to call as needed

## 2023-05-24 NOTE — ED INITIAL ASSESSMENT (HPI)
Presents to IC for white patches in back of throat and bad taste in mouth, States tonsils were removed a long time ago.  Concerned of strep

## 2023-05-25 NOTE — DISCHARGE INSTRUCTIONS
Use mouth rinse two times a day for 10 days. IF you develop worsening sore throat, painful swallowing, fever, shortness of breath > return for re-evaluation.

## 2023-05-30 NOTE — TELEPHONE ENCOUNTER
Last annual: 3/23/2023 w/CAP, did not need OCP refill at that time. Requesting OCP refill today. Pt called, year supply of OCP sent. Pt informed if she stops BF and request combo pill to reach out. Pt states understanding.

## 2023-10-04 NOTE — LACTATION NOTE
LACTATION NOTE - MOTHER      Evaluation Type: Inpatient    Problems identified  Problems identified: Knowledge deficit         Breastfeeding goal  Breastfeeding goal: To maintain breast milk feeding per patient goal    Maternal Assessment  Bilateral Breast
LACTATION NOTE - MOTHER      Evaluation Type: Inpatient    Problems identified  Problems identified: Knowledge deficit         Breastfeeding goal  Breastfeeding goal: To maintain breast milk feeding per patient goal    Maternal Assessment  Bilateral Breast
LACTATION NOTE - MOTHER      Evaluation Type: Inpatient    Problems identified  Problems identified: Knowledge deficit; Nipple trauma         Breastfeeding goal  Breastfeeding goal: To maintain breast milk feeding per patient goal    Maternal Assessment  Bi
This note was copied from a baby's chart.   LACTATION NOTE - INFANT    Evaluation Type  Evaluation Type: Inpatient    Problems & Assessment  Infant Assessment: Good skin turgor;Hunger cues present;Oral mucous membranes moist;Skin color: pink or appropriate
This note was copied from a baby's chart.   LACTATION NOTE - INFANT    Evaluation Type  Evaluation Type: Inpatient    Problems & Assessment  Infant Assessment: Good skin turgor;Hunger cues present;Oral mucous membranes moist;Skin color: pink or appropriate
This note was copied from a baby's chart.   LACTATION NOTE - INFANT    Evaluation Type  Evaluation Type: Inpatient    Problems & Assessment  Infant Assessment: Good skin turgor;Skin color: pink or appropriate for ethnicity;Oral mucous membranes moist;Hunger
This note was copied from a baby's chart.   LACTATION NOTE - INFANT    Evaluation Type  Evaluation Type: Inpatient    Problems & Assessment  Muscle tone: Appropriate for GA    Feeding Assessment  Summary Current Feeding: Adlib;Breastfeeding exclusively  Caryn
Yes

## 2024-01-02 NOTE — PROGRESS NOTES
Diagnosis:   Cervical radiculopathy (M54.12)          Referring Provider: Kasey  Date of Evaluation:    12/29/2023    Precautions:  None Next MD visit:   none scheduled  Date of Surgery: n/a   Insurance Primary/Secondary: N/A / N/A     # Auth Visits: 2            Subjective: Patient reports exercises went well.  States still tightness at L side of neck/upper shoulder.    Pain: 4/10      Objective: see outpatient daily record.       Assessment: Added ultrasound to L UT/L cervical paraspinals due to restrictions noted, added pec stretch door due to noted pec tightness and added 2 scapular strengthening exercises for home.  Continued STM and therapist mobility of cervical spine.       Goals:   Goals: (to be met in 8-10 visits)   Pt will report pain free with all ADLs.  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   Pt will increase scapular strength by 1/2 grade or more to be pain free with ADLs and lifting her kids.   Pt will decrease UT and Scalene tightness to be WFL to be pain free with head and shoulder movement.   Pt will report sitting for >30 mins without pain.   Pt will be independent with HEP.        Plan: Continue modalities, STM, ROM work, improvement of flexibility and strength.   Date: 1/2/2024  TX#: 2/10   Seated - ultrasound L upper trap/L cervical paraspinals 1.4 w/cm2 cont.  Supine - STM L UT/L cervical paraspinals/sub occipital region.  Stretches scalanes, Upper trap L, work into retraction and extension.    Supine - stretches L shoulder flex/Diagonal D2 pattern with noted resrictions pec area.  Standing - pec stretch at door 2 positions, Y and at side of body.  Seated retractions x 10  Seated scapular retractions x 10  Shoulder abd stretch at wall x 10  Standing rows RTB x 10  Seated bilat shoulder ER x 10   Reviewed posture affects on UT            HEP: pec stretch door, standing or seated - Rows, bilat shoulder ER  RTB    Charges: ultrasound 8 min, EX 20min, STM 12 min       Total Timed Treatment: 40 min  Total Treatment Time: 40 min

## 2024-01-09 NOTE — PROGRESS NOTES
Diagnosis:   Cervical radiculopathy (M54.12)          Referring Provider: Kasey  Date of Evaluation:    12/29/2023    Precautions:  None Next MD visit:   none scheduled  Date of Surgery: n/a   Insurance Primary/Secondary: N/A / N/A     # Auth Visits: 2/5            Subjective: Patient reports the neck is better, did hurt end of her day yesterday as started back to work.  States the shoulder continues to be painful at 3/10- out to the side.  States a couple of exercises seem painful and wants to review them.     Pain: 3/10      Objective: see outpatient daily record.       Assessment: had to adjust HEP of pec stretch at door and changed ER to isometric holds due to some c/o pain.  Added further isometrics for abd and flex for L shoulder and added retraction/extension cervical region.  Also added strengthening scapular region with prone extension and mid trap .      Goals:   Goals: (to be met in 8-10 visits)   Pt will report pain free with all ADLs.  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   Pt will increase scapular strength by 1/2 grade or more to be pain free with ADLs and lifting her kids.   Pt will decrease UT and Scalene tightness to be WFL to be pain free with head and shoulder movement.   Pt will report sitting for >30 mins without pain.   Pt will be independent with HEP.        Plan: Continue modalities, STM, ROM work, improvement of flexibility and strength.   Date: 1/9/2024  TX#: 2/5   Seated - ultrasound L upper trap/L cervical paraspinals 1.4 w/cm2 cont.  Supine - STM L UT/L cervical paraspinals/sub occipital region.  Stretches scalanes, Upper trap L, work into retraction and extension.    Prone shoulder ext x 15  Prone mid trap 2 x 10  Standing - pec stretch at door 2 positions, Y and at side of body.  Seated retractions x 10  Seated retraction/extension x 3  Standing rows RTB x 10  Standing ER step outs and hold x 5 sec, x  5  Isometric abd/flex at wall 5 x 5 sec hold              HEP: step out hold ER, isometric abd/flex, retraction/extension    Charges: ultrasound 8 min, EX 20min, STM 12 min       Total Timed Treatment: 40 min  Total Treatment Time: 40 min

## 2024-01-11 NOTE — PROGRESS NOTES
Diagnosis:   Cervical radiculopathy (M54.12)          Referring Provider: Kasey  Date of Evaluation:    12/29/2023    Precautions:  None Next MD visit:   none scheduled  Date of Surgery: n/a   Insurance Primary/Secondary: N/A / N/A     # Auth Visits: 3/5            Subjective: Patient reports the neck continues to feel better.  Reports no pain at the shoulder today. States feels some soreness at her back muscles with the exercises and working on her posture.      Pain: 0/10      Objective: see outpatient daily record.       Assessment:Decreased restriction L paraspinals noted.  Improved flexibility noted from start to end of session.  Patient overall pain levels have improved.  Patient continues exercises at home and work on her posture.      Goals:   Goals: (to be met in 8-10 visits)   Pt will report pain free with all ADLs.  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   Pt will increase scapular strength by 1/2 grade or more to be pain free with ADLs and lifting her kids.   Pt will decrease UT and Scalene tightness to be WFL to be pain free with head and shoulder movement.   Pt will report sitting for >30 mins without pain.   Pt will be independent with HEP.        Plan: Continue modalities, STM, ROM work, improvement of flexibility and strength.   Date: 1/112024  TX#: 3/5   Seated - ultrasound L upper trap/L cervical paraspinals 1.4 w/cm2 cont. X 8 minutes.  Supine - STM L UT/L cervical paraspinals/sub occipital region.  Stretches scalanes, Upper trap L, work into retraction and extension.    Standing - pec stretch at door 2 positions, Y and at side of body.  Seated retractions x 10  Seated retraction/extension x 3  Standing rows black tubing x 15  Standing ER step outs and hold x 5 sec, x 10  Isometric abd/flex at wall 5 x 5 sec hold- not completed  Lat pull downs 1 x 15 7#, 1 x 10 7# each arm  Supine - stretches UT/Scalanes,  retraction  /extension              HEP: no changes.    Charges: ultrasound 8 min, EX 20min, STM 12 min       Total Timed Treatment: 40 min  Total Treatment Time: 40 min

## 2024-01-16 NOTE — PROGRESS NOTES
Diagnosis:   Cervical radiculopathy (M54.12)          Referring Provider: Kasey  Date of Evaluation:    12/29/2023    Precautions:  None Next MD visit:   none scheduled  Date of Surgery: n/a   Insurance Primary/Secondary: N/A / N/A     # Auth Visits: 4/5            Subjective: Patient reports there is no pain at the neck or the shoulder this morning.       Pain: 0/10      Objective: see outpatient daily record.       Assessment:Patient is continuing to show less restriction with stretches at cervical region on the L side.  Overall mobility has improved and is also improving in strength.  Pain at shoulder also has improved.      Goals:   Goals: (to be met in 8-10 visits)   Pt will report pain free with all ADLs.  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   Pt will increase scapular strength by 1/2 grade or more to be pain free with ADLs and lifting her kids.   Pt will decrease UT and Scalene tightness to be WFL to be pain free with head and shoulder movement.   Pt will report sitting for >30 mins without pain.   Pt will be independent with HEP.        Plan: Continue modalities, STM, ROM work, improvement of flexibility and strength.  Will reassess after next 4 visits.    Date: 1/16/2024  TX#: 4/5   Seated - ultrasound L upper trap/L cervical paraspinals 1.4 w/cm2 cont. X 8 minutes.  Supine - STM L UT/L cervical paraspinals/sub occipital region.  Stretches scalanes, Upper trap L, work into retraction and extension.    Standing - pec stretch at door at side of body x 30 seconds  Seated retractions x 12  Seated retraction/extension x 3  Standing rows black tubing x 20  Standing ER step outs and hold x 3 sec, x 10  Lat pull downs 2 x 107#              HEP: no changes.    Charges: ultrasound 8 min, EX 20min, STM 12 min       Total Timed Treatment: 40 min  Total Treatment Time: 40 min

## 2024-01-18 NOTE — PROGRESS NOTES
Diagnosis:   Cervical radiculopathy (M54.12)          Referring Provider: Kasey  Date of Evaluation:    12/29/2023    Precautions:  None Next MD visit:   none scheduled  Date of Surgery: n/a   Insurance Primary/Secondary: N/A / N/A     # Auth Visits: 5/10           Subjective: Patient reports there is no pain  this morning.  Slight pain if reaches back behind her with the R arm.    Pain: 0/10      Objective: see outpatient daily record.       Assessment:Patient ihas continued to report no pain neck and no headaches for last week.  Still some pain at anterior upper arm if reaching back with arm.     Goals:   Goals: (to be met in 8-10 visits)   Pt will report pain free with all ADLs.  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   Pt will increase scapular strength by 1/2 grade or more to be pain free with ADLs and lifting her kids.   Pt will decrease UT and Scalene tightness to be WFL to be pain free with head and shoulder movement.   Pt will report sitting for >30 mins without pain.   Pt will be independent with HEP.        Plan: Continue modalities, STM, ROM work, improvement of flexibility and strength.    Date: 1/18/2024  TX#: 5/10   Seated - ultrasound L upper trap/L cervical paraspinals 1.4 w/cm2 cont. X 8 minutes.  Supine - STM L UT/L cervical paraspinals/sub occipital region.  Stretches scalanes, Upper trap L, work into retraction and extension.    Standing - pec stretch at door at side of body x 30 seconds  Seated retractions x 12  Seated retraction/extension x 3  Standing rows black tubing x 20  Standing ER step outs and hold x 3 sec, x 10  Lat pull downs 2 x 107#  Standing horozontal abd with R single armTB 2 x 10  Standing bicep curls RTB 2 x 10              HEP: no changes.    Charges: ultrasound 8 min, EX 20min, STM 12 min       Total Timed Treatment: 40 min  Total Treatment Time: 40 min

## 2024-01-23 NOTE — PROGRESS NOTES
Diagnosis:   Cervical radiculopathy (M54.12)          Referring Provider: Kasey  Date of Evaluation:    12/29/2023    Precautions:  None Next MD visit:   none scheduled  Date of Surgery: n/a   Insurance Primary/Secondary: N/A / N/A     # Auth Visits: 6/10           Subjective: Patient reports there is no pain  this morning at neck or shoulder.  States was able to do a pilates class without any problems.     Pain: 0/10      Objective: see outpatient daily record.       Assessment: Patient is continuing to progress.  Will reassess at next visit to see if further visits will be needed.  There will be a longer time between the next visit to see how symptoms do with longer duration between sessions.    Goals:   Goals: (to be met in 8-10 visits)   Pt will report pain free with all ADLs.  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   Pt will increase scapular strength by 1/2 grade or more to be pain free with ADLs and lifting her kids.   Pt will decrease UT and Scalene tightness to be WFL to be pain free with head and shoulder movement.   Pt will report sitting for >30 mins without pain.   Pt will be independent with HEP.        Plan: Reassess next session.  Date: 1/23/2024  TX#: 6/10   Seated - ultrasound L upper trap/L cervical paraspinals 1.4 w/cm2 cont. X 8 minutes.  Supine - STM L UT/L cervical paraspinals/sub occipital region.  Stretches scalanes, Upper trap L, work into retraction and extension.    Standing - pec stretch at door at side of body x 30 seconds  Seated retractions x 15  Seated retraction/extension x 3  Standing rows black tubing x15  Standing bilat ER x 12 reps YTB  Lat pull downs 2 x 10 7#  Standing horozontal abd with R single arm theratube  2 x 10              HEP: no changes.    Charges: ultrasound 8 min, EX 15min, STM 15 min       Total Timed Treatment: 38 min  Total Treatment Time: 40 min

## 2024-02-01 NOTE — PROGRESS NOTES
Diagnosis:   Cervical radiculopathy (M54.12)          Referring Provider: Kasey  Date of Evaluation:    12/29/2023    Precautions:  None Next MD visit:   none scheduled  Date of Surgery: n/a   Insurance Primary/Secondary: N/A / N/A     # Auth Visits: [id number]         DISCHARGE SUMMARY  Subjective: Patient reports she has had no pain at the neck.  States also no headaches.  States has also been able to start classes one x week, various - yoga, weights and will be doing spin this weekend.  Patient feels she is good to continue now on her own.  States she also not noticed any pain at her R shoulder.   Pain: 0/10      Objective: see outpatient daily record.   AROM:  cervical spine - ROT R 68   ROT L 70.   SBR 38  SBL 40  Extension 70  Flexion 70    MMT:   Shoulder Flex: R 5/5, L 4+/5  Shoulder ABD (C5): R 5/5, L 5/5    Rhomboids: R 4+/5,  L 4+/5          Mid trap: R 4+/5; L 4+  Lats: R 5/5, L 5/5                              Low trap:  R 4+/5      L 4/5   - P slight  pain front of shoulder, NW    NDI initially 22% ,    Presently NDI 2%     Assessment: Patient has made excellent progress with return of motion cervical spine to WNL and strength shoulder/scapular muscles to 4+/5 or greater except lower trap on L which is 4/5.  Strength has greatly improved from initial session and there is no longer any c/o pain and no reported stiffness at neck.  Patient has started back to work outs without any problems and no longer is reporting headaches.     Goals:   Goals: (to be met in 8-10 visits)   Pt will report pain free with all ADLs. MET  Pt will increase cervical rotation >70 degrees to be able to turn her head to check her blind spot while driving.  Met  Pt will increase scapular strength by 1/2 grade or more to be pain free with ADLs and lifting her kids.  Met  Pt will decrease UT and Scalene tightness to be WFL to be pain free with head and shoulder movement.  Met  Pt will report sitting for >30 mins without pain.  Met  Pt  will be independent with HEP. - MET       Plan: Discharge to independent HEP, patient in agreement.  Date: 2/1/2024  TX#: 7/10   Seated - ultrasound L upper trap/L cervical paraspinals 1.4 w/cm2 cont. X 8 minutes.  Supine - STM L UT/L cervical paraspinals/sub occipital region.  Stretches scalanes, Upper trap L, work into retraction and extension.    Reassessment   IFC and heat L UT/shoulder region x 10 min.                 HEP: no changes.    Charges: ultrasound 8 min,STM 10 min,  estim unattended 10 min     38 minutes treatment.

## 2024-03-26 NOTE — PROGRESS NOTES
Patient ID: Claudia High is a 28 year old female.    HPI  Chief Complaint   Patient presents with    Toenail     Possible fungus -      Up-to-date on Pap smear.    She has 2 to orders.  She is a teacher.  She is been  for 4 years but unfortunately she started tearing up when I asked her about this as prior she had asked for a referral to a therapist due to some anxiety and depression.  They have been having some marriage issues.    She states that the toenails have had fungus for years now.  She used a topical solution which did not help at all.  She states now the left fifth toenail has also discolored.      Health Maintenance   Topic Date Due    Annual Physical  Never done    COVID-19 Vaccine (1 - 2023-24 season) Never done    Pap Smear  09/17/2023    Influenza Vaccine (1) Never done    Annual Depression Screening  01/01/2024    DTaP,Tdap,and Td Vaccines (10 - Td or Tdap) 03/26/2032    Pneumococcal Vaccine: Birth to 64yrs  Aged Out       =======================================================    Lab Results   Component Value Date    WBC 11.8 (H) 05/13/2022    RBC 3.12 (L) 05/13/2022    HGB 9.7 (L) 05/13/2022    HCT 30.2 (L) 05/13/2022    .0 (L) 05/13/2022    MPV 9.1 11/20/2018    MCV 96.8 05/13/2022    MCH 31.1 05/13/2022    MCHC 32.1 05/13/2022    RDW 12.5 05/13/2022    NEPRELIM 8.00 (H) 05/12/2022    NEUT 5.6 05/20/2014    LYMPH 2.6 05/20/2014    MON 0.4 05/20/2014    EOS 0.1 05/20/2014    BASO 0.1 05/20/2014    NEPERCENT 72.3 05/12/2022    LYPERCENT 20.1 05/12/2022    MOPERCENT 5.8 05/12/2022    EOPERCENT 0.6 05/12/2022    BAPERCENT 0.3 05/12/2022    NE 8.00 (H) 05/12/2022    LYMABS 2.22 05/12/2022    MOABSO 0.64 05/12/2022    EOABSO 0.07 05/12/2022    BAABSO 0.03 05/12/2022       Lab Results   Component Value Date    GLU 77 12/01/2020    BUN 16 12/01/2020    BUNCREA 20.0 12/01/2020    CREATSERUM 0.80 12/01/2020    ANIONGAP 4 12/01/2020    GFRNAA 103 12/01/2020    GFRAA 119 12/01/2020    CA  9.2 12/01/2020    OSMOCALC 286 12/01/2020    ALKPHO 69 12/01/2020    AST 13 (L) 12/01/2020    ALT 15 12/01/2020    ALKPHOS 50 05/20/2014    BILT 0.7 12/01/2020    TP 7.5 12/01/2020    ALB 4.2 12/01/2020    GLOBULIN 3.3 12/01/2020    AGRATIO 1.1 05/20/2014     12/01/2020    K 4.0 12/01/2020     12/01/2020    CO2 29.0 12/01/2020       Lab Results   Component Value Date    GLU 77 12/01/2020    BUN 16 12/01/2020    CREATSERUM 0.80 12/01/2020    BUNCREA 20.0 12/01/2020    ANIONGAP 4 12/01/2020    GFRAA 119 12/01/2020    GFRNAA 103 12/01/2020    CA 9.2 12/01/2020     12/01/2020    K 4.0 12/01/2020     12/01/2020    CO2 29.0 12/01/2020    OSMOCALC 286 12/01/2020       Lab Results   Component Value Date    COLORUR Yellow 11/08/2021    CLARITY Cloudy (A) 11/08/2021    SPECGRAVITY 1.017 11/08/2021    GLUUR Negative 11/08/2021    BILUR Negative 11/08/2021    KETUR Negative 11/08/2021    BLOODURINE Negative 11/08/2021    PHURINE 8.0 11/08/2021    PROUR Negative 11/08/2021    UROBILINOGEN <2.0 11/08/2021    NITRITE Negative 11/08/2021    LEUUR Negative 11/08/2021    NMIC Microscopic not indicated 11/08/2021    WBCUR 0 03/18/2019    RBCUR <1 03/18/2019    EPIUR Few 03/18/2019    BACUR Negative 03/18/2019     No results found for: \"EAG\", \"A1C\"    No results found for: \"MALBP\", \"CREUR\", \"CREAURINE\", \"MIALBURINE\", \"MCRRATIOUR\", \"MALBCRECALC\", \"MICROALBUMIN\", \"CREAUR\", \"MALBCREACALC\"    Lab Results   Component Value Date    CHOLEST 160 12/01/2020    TRIG 65 12/01/2020    HDL 61 (H) 12/01/2020    LDL 86 12/01/2020    VLDL 13 12/01/2020    NONHDLC 99 12/01/2020    CALCNONHDL 124 03/22/2016     TSH (mIU/mL)   Date Value   12/01/2020 1.910     TSH (S) (uIU/mL)   Date Value   03/22/2016 3.32       Lab Results   Component Value Date    B12 709 12/01/2020       No results found for: \"IRON\", \"IRONTOT\"    No results found for: \"SAT\"    No results found for: \"IRON\", \"IRONTOT\", \"TIBC\", \"IRONSAT\", \"TRANSFERRIN\", \"TIBCP\",  \"IRONBIND\", \"SAT\", \"SATUR\"    No results found for: \"ITA\"    Lab Results   Component Value Date    VITD 31.2 12/01/2020     No results found for: \"PSA\", \"QPSA\", \"TOTPSASCREEN\"    =======================================================    Wt Readings from Last 6 Encounters:   03/26/24 121 lb 6.4 oz (55.1 kg)   11/22/23 120 lb (54.4 kg)   03/23/23 125 lb 6.4 oz (56.9 kg)   07/14/22 139 lb (63 kg)   11/17/20 120 lb (54.4 kg)   10/01/20 123 lb (55.8 kg)               BMI Readings from Last 6 Encounters:   03/26/24 19.59 kg/m²   11/22/23 19.37 kg/m²   03/23/23 20.24 kg/m²   07/14/22 22.44 kg/m²   11/17/20 19.37 kg/m²   10/01/20 19.85 kg/m²       BP Readings from Last 6 Encounters:   03/26/24 104/65   11/22/23 106/72   11/17/23 120/76   05/24/23 128/72   03/23/23 109/75   07/14/22 115/75         Review of Systems  No chest pain, shortness of breath.    Past Medical History:   Diagnosis Date    Acne     Celiac disease (HCC) 2014       Past Surgical History:   Procedure Laterality Date    OTHER SURGICAL HISTORY      SIGMOIDOSCOPY, FLEXIBLE; W/DILATION, BALLOON, 1/> STRICTURES  2014    UPPER GI ENDOSCOPY PERFORMED  2014       Social History     Socioeconomic History    Marital status:      Spouse name: Not on file    Number of children: Not on file    Years of education: Not on file    Highest education level: Not on file   Occupational History    Not on file   Tobacco Use    Smoking status: Never    Smokeless tobacco: Never   Substance and Sexual Activity    Alcohol use: No    Drug use: No    Sexual activity: Not on file   Other Topics Concern     Service Not Asked    Blood Transfusions Not Asked    Caffeine Concern No    Occupational Exposure Not Asked    Hobby Hazards Not Asked    Sleep Concern Not Asked    Stress Concern Not Asked    Weight Concern Not Asked    Special Diet Not Asked    Back Care Not Asked    Exercise Not Asked    Bike Helmet Not Asked    Seat Belt Not Asked    Self-Exams Not Asked    Grew  up on a farm Not Asked    History of tanning No    Outdoor occupation Not Asked    Breast feeding Yes    Reaction to local anesthetic No    Pt has a pacemaker No    Pt has a defibrillator No   Social History Narrative    Not on file     Social Determinants of Health     Financial Resource Strain: Not on file   Food Insecurity: Not on file   Transportation Needs: Not on file   Physical Activity: Not on file   Stress: Not on file   Social Connections: Not on file   Housing Stability: Not on file          No current outpatient medications on file.     Allergies:  Allergies   Allergen Reactions    Gluten Meal DIARRHEA and NAUSEA AND VOMITING      PHYSICAL EXAM:   Physical Exam  Physical Exam   Constitutional: . She appears well-developed and well-nourished. No distress.   HENT:   Head: Normocephalic.   Right Ear: Tympanic membrane and ear canal normal.   Left Ear: Tympanic membrane and ear canal normal.   Mouth/Throat: Oropharynx is clear and moist and mucous membranes are normal.   Eyes: Conjunctivae and EOM are normal. Pupils are equal, round, and reactive to light.   Neck: Normal range of motion. Neck supple. No thyromegaly present.   Cardiovascular: Normal rate, regular rhythm and no murmur heard.  Pulmonary/Chest: Effort normal and breath sounds normal. No respiratory distress.   Abdominal: Soft. Bowel sounds are normal. There is no hepatosplenomegaly. There is no tenderness.   Lymphadenopathy:     She has no  cervical adenopathy.   Neurological: She is alert and oriented to person, place, and time . She has normal reflexes. No cranial nerve deficit.   Skin: Skin is warm and dry. No rash noted.  She has yellow abnormal color nails for the right second toe and the left fourth and fifth toe..  They are not thick however.  No tenderness.  Psychiatric: She has a tearful affect discussing the marriage.  We will get her to therapy.  Vitals reviewed.    Blood pressure 104/65, pulse 99, temperature 96.7 °F (35.9 °C),  height 5' 6\" (1.676 m), weight 121 lb 6.4 oz (55.1 kg), currently breastfeeding.         ASSESSMENT/PLAN:     Diagnoses and all orders for this visit:    Adult general medical exam  -     CBC With Differential With Platelet; Future  -     Comp Metabolic Panel (14); Future  -     Lipid Panel; Future  -     Assay, Thyroid Stim Hormone; Future    Onychomycosis of toenail  He does not drink alcohol.  We will check labs and if the liver tests are normal I will send in Lamisil for 3 months.  I explained how it works.  Anxiety  -     OP REFERRAL TO [facility]  Definitely should see a therapist and I went ahead and did a referral.  Depressive disorder  -     OP REFERRAL TO [facility]        Referrals (if applicable)  No orders of the defined types were placed in this encounter.        Follow up if symptoms persist.  Take medicine (if given) as prescribed.  Approach to treatment discussed and patient/family member understands and agrees to plan.     No follow-ups on file.      Codey Napoles DO  3/26/2024

## 2024-07-15 NOTE — TELEPHONE ENCOUNTER
Received call from [facility] requesting refill of terbinafine.  Originally prescribed 04/03, I believe the patient has completed 90 days of therapy.  Please advise.

## 2024-07-16 NOTE — TELEPHONE ENCOUNTER
Spoke with patient. Patients full name and date of birth verified. Patient states has not requested refills on terbinafine, nor has spoken with pharmacy.

## 2024-07-16 NOTE — TELEPHONE ENCOUNTER
Terbinifine is to be used for 3 months only. Please follow up if symptoms have not improved w medication.   KAMILA Henriquez, FNP-C

## 2024-12-26 NOTE — TELEPHONE ENCOUNTER
Patient confirms +home pregnancy test and last menstrual period 11/10. Cycles q 23-27 days. She is taking a prenatal vitamin. She has delivered with us in the past. Patient is aware she will see all doctors and first appointment is with a nurse. OB Nurse Education schedule not yet available. Patient to call us next week if we have not called her back to assist her in scheduling.

## 2025-01-08 NOTE — PROGRESS NOTES
Patient ID: New Barlow is a 21year old female. HPI  Patient presents with:  Sinus Problem  She is 15 weeks pregnant. She states for 6 days now she is been having some sinus issues but states she started feeling better yesterday.   She has some mi topically 2 (two) times daily. Use bid as directed Disp: 60 g Rfl: 12   clotrimazole 1 % External Cream Use bid Disp: 60 g Rfl: 3   triamcinolone acetonide 0.1 % External Cream Apply topically 2 (two) times daily as needed.  Disp: 15 g Rfl: 0   Norethin Ace days.    She is already much better. I talked to her about holding off on antibiotics at this time as I do not think it is necessary. Patient agrees. She is already much better. She will take fluids, Tylenol if needed and then the Afrin as directed.   1 no

## 2025-01-10 NOTE — ED PROVIDER NOTES
Patient Seen in: [facility]      History     Chief Complaint   Patient presents with    Fever     Stated Complaint: medical advice    Subjective:   HPI    Patient is a 29 female, 8 weeks pregnant by LMP, presenting to immediate care for evaluation of cold/flulike symptoms for 3 days.  Symptoms include fever, chills, malaise, fatigue, nasal congestion, cough.  Symptoms are mild intermittent.  No fever in clinic.  Taking Tylenol for symptoms. no chest pain or shortness of breath.  Currently 8 weeks pregnant on prenatal vitamins.  No OB/GYN complaints.  Not immunocompromise.  No cardiopulmonary history      Objective:     Past Medical History:    Acne    Celiac disease (HCC)              Past Surgical History:   Procedure Laterality Date    Other surgical history      Sigmoidoscopy, flexible; w/dilation, balloon, 1/> strictures  2014    Upper gi endoscopy performed  2014                Social History     Socioeconomic History    Marital status:    Tobacco Use    Smoking status: Never    Smokeless tobacco: Never   Substance and Sexual Activity    Alcohol use: No    Drug use: No   Other Topics Concern    Caffeine Concern No    History of tanning No    Breast feeding Yes    Reaction to local anesthetic No    Pt has a pacemaker No    Pt has a defibrillator No              Review of Systems   Constitutional:  Positive for chills and fever.   HENT:  Positive for congestion.    Respiratory:  Positive for cough. Negative for shortness of breath.    Cardiovascular:  Negative for chest pain.   Gastrointestinal:  Negative for abdominal pain, diarrhea and vomiting.   Musculoskeletal:  Positive for myalgias.   Skin:  Negative for rash.   Neurological:  Negative for dizziness, weakness, light-headedness and headaches.   Psychiatric/Behavioral:  Negative for confusion.    All other systems reviewed and are negative.      Positive for stated complaint: medical advice  Other systems are as noted in  HPI.  Constitutional and vital signs reviewed.      All other systems reviewed and negative except as noted above.    Physical Exam     ED Triage Vitals [01/10/25 1720]   /67   Pulse 84   Resp 12   Temp 98.7 °F (37.1 °C)   Temp src Oral   SpO2 100 %   O2 Device None (Room air)       Current Vitals:   Vital Signs  BP: 107/67  Pulse: 84  Resp: 12  Temp: 98.7 °F (37.1 °C)  Temp src: Oral    Oxygen Therapy  SpO2: 100 %  O2 Device: None (Room air)        Physical Exam  Vitals and nursing note reviewed.   Constitutional:       General: She is not in acute distress.     Appearance: Normal appearance. She is not ill-appearing, toxic-appearing or diaphoretic.   HENT:      Head: Normocephalic and atraumatic.      Right Ear: Tympanic membrane normal.      Left Ear: Tympanic membrane normal.      Mouth/Throat:      Mouth: Mucous membranes are moist.   Eyes:      Conjunctiva/sclera: Conjunctivae normal.   Cardiovascular:      Rate and Rhythm: Normal rate and regular rhythm.      Pulses: Normal pulses.   Pulmonary:      Effort: Pulmonary effort is normal. No respiratory distress.   Neurological:      General: No focal deficit present.      Mental Status: She is alert and oriented to person, place, and time.      Motor: No weakness.      Coordination: Coordination normal.      Gait: Gait normal.   Psychiatric:         Mood and Affect: Mood normal.         Behavior: Behavior normal.         ED Course     Labs Reviewed   POCT FLU TEST - Abnormal; Notable for the following components:       Result Value    POCT INFLUENZA A Positive (*)     All other components within normal limits    Narrative:     This assay is a rapid molecular in vitro test utilizing nucleic acid amplification of influenza A and B viral RNA.     Results for orders placed or performed during the hospital encounter of 01/10/25   POCT Flu Test    Collection Time: 01/10/25  5:25 PM    Specimen: Nares; Other   Result Value Ref Range    POCT INFLUENZA A Positive  (A) Negative    POCT INFLUENZA B Negative Negative            MDM     Differential diagnoses considered included, but are not exclusive of: URI, influenza, COVID, otitis, sinusitis, pharyngitis, bronchitis, pneumonia, etc.    Dx: Influenza A, Initial Encounter  Influenza A PCR positive  Overall well-appearing  Hemodynamically stable  Afebrile  Tolerating PO  Outpatient management  Supportive care  Rest  Oral hydration  Tylenol as needed for pain/fever/myalgia  Out of Tamiflu benefit window  Discussed with/benefits of Tamiflu extension given currently pregnant, deferred, will treat supportively  OTC antitussive  Droplet and Isolation Precautions   PCP follow  Return precaution  Discharge instructions Influenza      Medical Decision Making      Disposition and Plan     Clinical Impression:  1. Influenza A    2. Acute febrile illness    3. First trimester pregnancy (HCC)         Disposition:  Discharge  1/10/2025  5:56 pm    Follow-up:  No follow-up provider specified.        Medications Prescribed:  There are no discharge medications for this patient.          Supplementary Documentation:

## 2025-01-10 NOTE — ED INITIAL ASSESSMENT (HPI)
Patient arrives ambulatory with c/o fever x 3 days, body aches, chills, cough.  [facility] reports +preg test. LMP 11/10/24

## 2025-01-21 NOTE — PROGRESS NOTES
Pt called for OBN appt today with no complaints. Pt informed again of both male and female providers and the need to rotate PN appt with all providers since OB on-call will be the one that delivers her.   Varicella, qual HCG and Normal PN labs ordered. Pt advised all labs must be completed and resulted prior to NPN appt. If labs are not completed and resulted the NPN appt will be cancelled.  Assisted pt with scheduling NPN appt with MD.       Height: 5 ft 6 in  Weight: 123 lb  BMI: 19.83    Partner's name is Tin High contact #[phone] ; race: [place]   Patients Occupation: Teacher     MEDICAL HISTORY    Anemia No    Anesthetic complications No    Anxiety/Depression  Yes Anxiety-in college- was on medication until her senior year    Autoimmune Disorder No    Asthma  No    Cancer No    Diabetes  No    Gyne/breast Surgery No    Heart Disease No    Hepatitis/Liver Disease  No    History of blood transfusion No    History of abnormal pap No    Hypertension  No    Infertility  No    Kidney Disease/Frequent UTIs  No    Medication Allergies No    Latex Allergies No    Food Allergies  Yes Gluten Meal   Neurological Disorder/Epilepsy No    Operations/Hospitalizations Yes ACL reconstruction  Left 2016    T&A-2016   TB exposure  No    Thyroid Dysfunction No    Trauma/Violence  No    Uterine Anomaly  No    Uterine Fibroids  No    Variocosities/DVTs No    Smoker No    Drug usage in prior year Yes Vapped Marijuana on and off for a year to help with sleep-last used 3 months ago. She is aware of random drug toxicology in pregnancy    Alcohol No    Would you accept a blood transfusion? If no, are you a [employer]ian? Yes    No     Will accept blood and blood products        INFECTION HISTORY    Chlamydia No    Pt or partner have hx of Genital Herpes No    Gonorrhea No    Hepatitis B No    HIV No    HPV No    MRSA Yes MRSA as a child-in knee wound   Syphilis No    Tattoos No    Live with someone or Exposed to TB No     Rash or viral illness since LMP  Yes +Flu A on 1/10/2025   Varicella No Vaccinated    Pets No        GENETICS SCREENING    Genetic Screening    Genetic Screening/Teratology Counseling- Includes patient, baby's father, or anyone in either family with:  Patient's age 35 years or older as of estimated date of delivery: No     Thalassemia (Italian, Greek, Mediterranean, or  background): MCV less than 80: No     Neural tube defect (Meningomyelocele, Spina bifida, or Anencephaly): No     Congenital heart defect: Yes (Comment: MIL has \"skipped beats\"-FOB was tested and negative for cardiac condition)     Down syndrome: No     Maximilian-Sachs (Ashkenazi Synagogue, Cajun, Tajik St Helenian): No     Canavan disease (Ashkenazi Synagogue): No     Familial dysautonomia (Ashkenazi Synagogue): No     Sickle cell disease or trait (): No     Hemophilia or other blood disorders: No     Muscular dystrophy: No    Cystic fibrosis: No     Smoaks's chorea: No     Intellectual disability and/or autism: No     Other inherited genetic or chromosomal disorder: No     Maternal metabolic disorder (eg. Type 1 diabetes, PKU): No     Patient or baby's father had child with birth defects not listed above: Yes (Comment: duane syndrome-of patients older daughter)     Recurrent pregnancy loss, or a stillbirth: No     Medications (including supplements, vitamins, herbs, or OTC drugs)/illicit/recreational drugs/alcohol since last menstrual period: Yes     If yes, agent(s) and strength/dosage: Prenatal vitamins                  MISC    Infant vaccinations  No     Pt. Answered YES to any 5P questions and/or  risk factors are present.     Pt. Education was provided on OUD and pregnancy, including the benefits of treatment for mom and baby and the risk of JAZMYNE. Pt. Given OUD and JAZMYNE handouts.

## 2025-01-29 NOTE — PROGRESS NOTES
Wants first trimester screen.   Pap/HPV/Gc/chlamydia/trichomonas.   Bedside ultrasound--+FHT, c/w dates.  RTC 4 wk

## 2025-03-11 NOTE — TELEPHONE ENCOUNTER
Pt had appt with [facility] on 3/1 and needs order for 20 week US. Pt stated that [facility] advised her to schedule this with radiology in the hospital. Order placed for 20 week anatomy scan. Message to [facility] for sign off.

## 2025-05-02 NOTE — PROGRESS NOTES
Tin and kids at visit. Reviewed normal level 1. No S/S of PTL. [place]. I did recommend against but precautions discussed.

## 2025-05-23 NOTE — ED INITIAL ASSESSMENT (HPI)
Left index finger laceration - cut with a razor while at work, pt is 28 weeks pregnant with prenatal care

## 2025-05-23 NOTE — ED PROVIDER NOTES
Patient Seen in: [facility]        History  Chief Complaint   Patient presents with    Worker's Comp     Stated Complaint: wc cut finger left index finger    Subjective:   HPI    The patient is a 29-year-old female G3 para 2 at approximately 28 weeks EGA who presents now with laceration to her left index finger.  Patient states she accidentally cut herself with a razor blade knife at work.  Patient denies any numbness or tingling.  The patient denies any decreased range of motion.  The patient's tetanus is up-to-date      Objective:     Past Medical History:    Acne    Anxiety    Celiac disease (HCC)    Marijuana use              Past Surgical History:   Procedure Laterality Date    Anterior cruciate ligament repair Left 2016    Sigmoidoscopy, flexible; w/dilation, balloon, 1/> strictures  2014    Tonsillectomy  2016    Upper gi endoscopy performed  2014                Social History     Socioeconomic History    Marital status:    Tobacco Use    Smoking status: Never    Smokeless tobacco: Never   Vaping Use    Vaping status: Never Used   Substance and Sexual Activity    Alcohol use: No    Drug use: Not Currently     Types: Cannabis     Comment: Vapped on and off for a year-last used 3 months ago    Sexual activity: Yes   Other Topics Concern    Caffeine Concern No    History of tanning No    Breast feeding Yes    Reaction to local anesthetic No    Pt has a pacemaker No    Pt has a defibrillator No     Social Drivers of Health     Food Insecurity: No Food Insecurity (1/25/2025)    Food Insecurity     Food Insecurity: Never true   Transportation Needs: No Transportation Needs (1/25/2025)    Transportation Needs     Lack of Transportation: No   Stress: No Stress Concern Present (1/25/2025)    Stress     Feeling of Stress : No   Housing Stability: Low Risk  (1/25/2025)    Housing Stability     Housing Instability: No              Review of Systems    Positive for stated complaint: wc cut finger left  index finger  Other systems are as noted in HPI.  Constitutional and vital signs reviewed.      All other systems reviewed and negative except as noted above.                  Physical Exam    ED Triage Vitals [05/23/25 1103]   /67   Pulse 95   Resp 12   Temp 98.2 °F (36.8 °C)   Temp src Oral   SpO2 99 %   O2 Device None (Room air)       Current Vitals:   Vital Signs  BP: 115/67  Pulse: 95  Resp: 12  Temp: 98.2 °F (36.8 °C)  Temp src: Oral    Oxygen Therapy  SpO2: 99 %  O2 Device: None (Room air)            Physical Exam    Constitutional: Well-developed well-nourished in no acute distress  Head: Normocephalic, no swelling or tenderness  Eyes: Nonicteric sclera, no conjunctival injection  Vascular: Palpable left radial pulse with good capillary refill to all fingers  Neurologic: Patient is awake, alert and oriented ×3.  The patient's motor strength is 5 out of 5 and symmetric in the upper and lower extremities bilaterally  Extremities: No focal swelling or tenderness; normal range of motion of the left index finger  Skin: There is a full-thickness 2-1/2 cm laceration to the lateral aspect of the left index finger.          ED Course  Labs Reviewed - No data to display       Laceration repair:     Verbal consent was obtained from the patient. The 2.5 cm laceration on the left index finger was anesthetized in the usual fashion. The wound was scrubbed, draped and explored to its base with a gloved finger. There were no deep structures involved. No tendon injury was identified. The wound was repaired with 2 simple interrupted 5-0 nylon sutures. The wound repair was simple. The procedure was performed by myself.    Andres Owens MD  11:25 AM  05/23/25                    MDM     Abrasion versus laceration of the left index finger        Medical Decision Making      Disposition and Plan     Clinical Impression:  1. Laceration of left index finger without foreign body without damage to nail, initial encounter          Disposition:  Discharge  5/23/2025 11:26 am    Follow-up:  [facility] in [address] [address]  [phone]    Call for an appointment for suture removal in 7 to 10 days          Medications Prescribed:  Current Discharge Medication List                Supplementary Documentation:

## 2025-05-23 NOTE — DISCHARGE INSTRUCTIONS
Keep wound clean and dry.  Splint to prevent flexion/extension of the finger for 48 hours.  Follow-up with occupational health in 7 to 10 days for suture removal.  Follow-up sooner for any redness or drainage

## 2025-06-25 NOTE — PROGRESS NOTES
History of Present Illness  Claudia High is a 29 year old female who presents for a routine prenatal visit at 32 weeks and 3 days gestation.    Gestational status  - Currently 32 weeks and 3 days gestation  - No complications documented in prenatal record to date    Fetal well-being  - Good fetal movement    Uterine contractions  - Greenlee Jang contractions occurring a couple of times per day  - No associated pain    Vaginal discharge  - Increased vaginal discharge  - No malodor  - No leaking of amniotic fluid or blood    Prenatal laboratory screening  - Passed gestational diabetes screening  - No evidence of anemia    Physical Exam    CARDIOVASCULAR: Fetal heart rate 141 bpm.  ABDOMEN: Fundal height 32 cm.    Results      Assessment & Plan  Routine Prenatal Visit at 32 Weeks Gestation  32 weeks 3 days pregnant, no diabetes or anemia, normal fetal heart rate, Greenlee Jang contractions without pain, increased discharge without odor.  - Schedule prenatal visits at 34 and 36 weeks, then weekly until due date.  - Advise hydration: 8-10 glasses of water daily, closer to 10 in hot weather.  - Recommend outdoor activities early morning to avoid heat.    Indigestion  Advised on antacid use, Tums recommended initially, Pepcid as alternative if excessive Tums use.  - Recommend Tums for indigestion.  - If excessive Tums use, recommend Pepcid at bedtime.

## 2025-06-25 NOTE — PROGRESS NOTES
The following individual(s) verbally consented to be recorded using ambient AI listening technology and understand that they can each withdraw their consent to this listening technology at any point by asking the clinician to turn off or pause the recording:    Patient name: Claudai High  Additional names:  n/a

## 2025-07-22 NOTE — PROGRESS NOTES
Feeling well.  Good fetal movement.  GBS collected.  Just did CBC before appointment today.  RTC 1 week.  Labor instructions provided.

## (undated) DIAGNOSIS — O99.810 ABNORMAL GLUCOSE COMPLICATING PREGNANCY: ICD-10-CM

## (undated) DIAGNOSIS — Z13.1 ENCOUNTER FOR SCREENING FOR DIABETES MELLITUS: ICD-10-CM

## (undated) DIAGNOSIS — Z36.82 ENCOUNTER FOR ANTENATAL SCREENING FOR NUCHAL TRANSLUCENCY: ICD-10-CM

## (undated) DIAGNOSIS — Z13.0 ENCOUNTER FOR SCREENING FOR DISEASES OF THE BLOOD AND BLOOD-FORMING ORGANS AND CERTAIN DISORDERS INVOLVING THE IMMUNE MECHANISM: ICD-10-CM

## (undated) NOTE — LETTER
[address] [place] [address]      Authorization for Surgical Operation and Procedure     Date:___________                                                                                                         Time:__________  1. I hereby authorize                                                , my physician(s) and the assistant to perform the following surgical operation/ procedure and administer such anesthesia as may be determined necessary by my physician(s):                         Operation/Procedure name (s) External Version                               on [address] [place] [address]  2. I recognize that during the surgical operation/procedure, unforeseen conditions may necessitate additional or different procedures than those listed above. I, therefore, further authorize and request that the above-named surgeon, assistants, or designees perform such procedures as are, in their judgment, necessary and desirable. 3.   My surgeon/physician has discussed prior to my surgery the potential benefits, risks and side effects of this procedure; the likelihood of achieving goals; and potential problems that might occur during recuperation. They also discussed reasonable alternatives to the procedure, including risks, benefits, and side effects related to the alternatives and risks related to not receiving this procedure. I have had all my questions answered and I acknowledge that no guarantee has been made as to the result that may be obtained. 4.   Should the need arise during my operation or immediate post-operative period, I also consent to the administration of blood and/or blood products. Further, I understand that despite careful testing and screening of blood or blood products by collecting agencies, I may still be subject to ill effects as a result of receiving a blood transfusion and/or blood products.   The following are some, but not all, of the potential risks that can occur: fever and allergic reactions, hemolytic reactions, transmission of diseases such as Hepatitis, AIDS and Cytomegalovirus (CMV) and fluid overload. In the event that I wish to have an autologous transfusion of my own blood, or a directed donor transfusion. I will discuss this with my physician. 5.   I authorize the use of any specimen, organs, tissues, body parts or foreign objects that may be removed from my body during the operation/procedure for diagnosis, research or teaching purposes and their subsequent disposal by hospital authorities. I also authorize the release of specimen test results and/or written reports to my treating physician on the hospital medical staff or other referring or consulting physicians involved in my care, at the discretion of the Pathologist or my treating physician. 6.   I consent to the photographing or videotaping of the operations or procedures to be performed, including appropriate portions of my body for medical, scientific, or educational purposes, provided my identity is not revealed by the pictures or by descriptive texts accompanying them. If the procedure has been photographed/videotaped, the surgeon will obtain the original picture, image, videotape or CD. The hospital will not be responsible for storage, release or maintenance of the picture, image, tape or CD.    7.   I consent to the presence of a  or observers in the operating room as deemed necessary by my physician or their designees. 8.   I recognize that in the event my procedure results in extended X-Ray/fluoroscopy time, I may develop a skin reaction. 9. If I have a Do Not Attempt Resuscitation (DNAR) order in place, that status will be suspended while in the operating room, procedural suite, and during the recovery period unless otherwise explicitly stated by me (or a person authorized to consent on my behalf).  The surgeon or my attending physician will determine when the applicable recovery period ends for purposes of reinstating the DNAR order. 10. Patients having a sterilization procedure: I understand that if the procedure is successful the results will be permanent and it will therefore be impossible for me to inseminate, conceive, or bear children. I also understand that the procedure is intended to result in sterility, although the result has not been guaranteed. 11. I acknowledge that my physician has explained sedation/analgesia administration to me including the risk and benefits I consent to the administration of sedation/analgesia as may be necessary or desirable in the judgment of my physician. I CERTIFY THAT I HAVE READ AND FULLY UNDERSTAND THE ABOVE CONSENT TO OPERATION and/or OTHER PROCEDURE.    _________________________________________  __________________________________  Signature of Patient     Signature of Responsible Person         ___________________________________         Printed Name of Responsible Person          _________________________________                  Relationship to Patient  _________________________________________  ______________________________  Signature of Witness          Date  Time    STATEMENT OF PHYSICIAN My signature below affirms that prior to the time of the procedure; I have explained to the patient and/or his/her legal representative, the risks and benefits involved in the proposed treatment and any reasonable alternative to the proposed treatment. I have also explained the risks and benefits involved in refusal of the proposed treatment and alternatives to the proposed treatment and have answered the patient's questions. If I have a significant financial interest in a co-management agreement or a significant financial interest in any product or implant, or other significant relationship used in this procedure/surgery, I have disclosed this and had a discussion with my patient. _______________________________________________________________ _____________________________  Pedrito Valdivia                                                                                         (Date)                                   (Time)

## (undated) NOTE — LETTER
[facility]  Administration of Anesthesia  1. I, Avel Espitia, or _________________________________ acting on her behalf, (Patient) (Dependent/Representative) request to receive anesthesia for my pending procedure/operation/treatment. A physician (anesthesiologist) alone or an anesthesiologist working with a nurse anesthetist may administer my anesthesia. 2. I understand that my anesthesiologist is not an employee or agent of the hospital, but is an independent medical practitioner who has been permitted to use its facilities for the care and treatment of his/her patients. 3. I acknowledge that a physician from [facility] or their designate(s), recommended anesthesia for me using her/his medical judgment. The type(s) of anesthesia I may receive include:                a) General Anesthesia, b) Spinal/Epidural Anesthesia, c) Regional Anesthesia or d) Monitored Anesthesia Care. 4. If my spinal, regional or monitored anesthesia care (local) is not satisfactory for my comfort, or if my medical condition requires, I consent to the administration of general anesthesia. 5. I am aware that the practice of anesthesiology is not an exact science and that some foreseeable risks or consequences may occur. Some common risks/consequences include sore throat and hoarseness, nausea and vomiting, muscle soreness, backache, damage to the mouth/teeth/vocal cords and eye injury. I understand that more rare but serious potential risks of anesthesia include blood pressure changes, drug reactions, cardiac arrest, brain damage, paralysis or death. These risks apply to whether I have general, spinal/epidural, regional or monitored anesthesia care. 6. OBSTETRIC PATIENTS: Specific risks/consequences of spinal/epidural anesthesia may include itching, low blood pressure, difficulty urinating, slowing of the baby's heart rate and headache.  Rare risks include infections, high spinal block, spinal bleeding, seizure, cardiac arrest and death. 7. AWARENESS: I understand that it is possible (but unlikely) to have explicit memory of events from the operating room while under general anesthesia. 8. ELECTROCONVULSIVE THERAPY PATIENTS: This consent serves for all treatments in a single course of therapy. 9. I understand that I must inform my anesthesiologist when I last ate and/or drank to minimize the risk of anesthesia. 10. If I am pregnant, or may pregnant, I understand that elective surgery should be postponed until after the baby is born. Anesthetics cross the placenta and may temporarily anesthetize the baby. Although fetal complications of anesthesia during pregnancy are rare, they may include birth defects, premature labor, brain damage and death. 11. I certify that I informed the anesthesiologist, to the best of my ability, about medication I take including blood thinners, anticoagulants, herbal remedies, narcotics and recreational drugs (e.g. cocaine, marijuana, PCP). Failure to inform my anesthesiologist about these medicines may increase my risk of anesthetic complications. The nature and purpose of my anesthetic management was explained to me. I had the opportunity to ask questions and the answers and information provided meet my satisfaction.   I retain the right to withdraw this consent at any time prior to the administration of said anesthetic.    ___________________________________________________           _____________________________________________________  Patient Signature                                                                                      Witness Signature                ___________________________________________________           _____________________________________________________  Date/Time                                                                                               Responsible person in case of minor/ unconscious pt /Relationship    My signature below affirms that prior to the time of the procedure, I have explained to the patient and/or his/her guardian, the risks and benefits of undergoing anesthesia, as well as any reasonable alternatives.     ___________________________________________________            _____________________________________________________  Physician Signature                            Date/Time  Patient Name: Hilton Walker     : 1995     Printed: 2022      Medical Record #: [id number]                              Page 1 of 1    ----------ANESTHESIA CONSENT----------

## (undated) NOTE — LETTER
VACCINE ADMINISTRATION RECORD  PARENT / GUARDIAN APPROVAL  Date: 2025  Vaccine administered to: Claudia High     : 1995    MRN: [id number]    A copy of the appropriate Centers for Disease Control and Prevention Vaccine Information statement has been provided. I have read or have had explained the information about the diseases and the vaccines listed below. There was an opportunity to ask questions and any questions were answered satisfactorily. I believe that I understand the benefits and risks of the vaccine cited and ask that the vaccine(s) listed below be given to me or to the person named above (for whom I am authorized to make this request).    VACCINES ADMINISTERED:  Tdap    I have read and hereby agree to be bound by the terms of this agreement as stated above. My signature is valid until revoked by me in writing.  This document is signed by SELF, relationship: Self on 2025.:                                                                                                                                         Parent / Guardian Signature                                                Date    Sara LACEY CMA served as a witness to authentication that the identity of the person signing electronically is in fact the person represented as signing.

## (undated) NOTE — MR AVS SNAPSHOT
[address], [address]  [address] [facility]  [phone]               Thank you for choosing us for your health care visit with Andre Parker DO.   We are glad to serve you and happy to provide you with this summary Sign up for [employer], your secure online medical record. [employer] will allow you to access patient instructions from your recent visit,  view other health information, and more. To sign up or find more information, go to [email or web address] cl

## (undated) NOTE — LETTER
Date & Time: 5/23/2025, 11:26 AM  Patient: Claudia High  Encounter Provider(s):    Andres Owens MD       To Whom It May Concern:    Claudia High was seen and treated in our department on 5/23/2025. She can return to work with these limitations: Must keep wound clean and dry until sutures are removed.  Finger splint for 2 days to prevent movement of finger.  Follow-up with occupational health in 7 to 10 days for suture removal.  Call for an appointment.    If you have any questions or concerns, please do not hesitate to call.        _____________________________  Physician/APC Signature

## (undated) NOTE — LETTER
[facility]  Administration of Anesthesia  1. I, Leonela Medina, or _________________________________ acting on her behalf, (Patient) (Dependent/Representative) request to receive anesthesia for my pending procedure/operation/treatment. A physician (anesthesiologist) alone or an anesthesiologist working with a nurse anesthetist may administer my anesthesia. 2. I understand that my anesthesiologist is not an employee or agent of the hospital, but is an independent medical practitioner who has been permitted to use its facilities for the care and treatment of his/her patients. 3. I acknowledge that a physician from [facility] or their designate(s), recommended anesthesia for me using her/his medical judgment. The type(s) of anesthesia I may receive include:                a) General Anesthesia, b) Spinal/Epidural Anesthesia, c) Regional Anesthesia or d) Monitored Anesthesia Care. 4. If my spinal, regional or monitored anesthesia care (local) is not satisfactory for my comfort, or if my medical condition requires, I consent to the administration of general anesthesia. 5. I am aware that the practice of anesthesiology is not an exact science and that some foreseeable risks or consequences may occur. Some common risks/consequences include sore throat and hoarseness, nausea and vomiting, muscle soreness, backache, damage to the mouth/teeth/vocal cords and eye injury. I understand that more rare but serious potential risks of anesthesia include blood pressure changes, drug reactions, cardiac arrest, brain damage, paralysis or death. These risks apply to whether I have general, spinal/epidural, regional or monitored anesthesia care. 6. OBSTETRIC PATIENTS: Specific risks/consequences of spinal/epidural anesthesia may include itching, low blood pressure, difficulty urinating, slowing of the baby's heart rate and headache.  Rare risks include infections, high spinal block, spinal bleeding, seizure, cardiac arrest and death. 7. AWARENESS: I understand that it is possible (but unlikely) to have explicit memory of events from the operating room while under general anesthesia. 8. ELECTROCONVULSIVE THERAPY PATIENTS: This consent serves for all treatments in a single course of therapy. 9. I understand that I must inform my anesthesiologist when I last ate and/or drank to minimize the risk of anesthesia. 10. If I am pregnant, or may pregnant, I understand that elective surgery should be postponed until after the baby is born. Anesthetics cross the placenta and may temporarily anesthetize the baby. Although fetal complications of anesthesia during pregnancy are rare, they may include birth defects, premature labor, brain damage and death. 11. I certify that I informed the anesthesiologist, to the best of my ability, about medication I take including blood thinners, anticoagulants, herbal remedies, narcotics and recreational drugs (e.g. cocaine, marijuana, PCP). Failure to inform my anesthesiologist about these medicines may increase my risk of anesthetic complications. The nature and purpose of my anesthetic management was explained to me. I had the opportunity to ask questions and the answers and information provided meet my satisfaction.   I retain the right to withdraw this consent at any time prior to the administration of said anesthetic.    ___________________________________________________           _____________________________________________________  Patient Signature                                                                                      Witness Signature                ___________________________________________________           _____________________________________________________  Date/Time                                                                                               Responsible person in case of minor/ unconscious pt /Relationship    My signature below affirms that prior to the time of the procedure, I have explained to the patient and/or his/her guardian, the risks and benefits of undergoing anesthesia, as well as any reasonable alternatives.     ___________________________________________________            _____________________________________________________  Physician Signature                            Date/Time  Patient Name: Otilia Gottlieb     : 1995     Printed: 2022      Medical Record #: [id number]                              Page 1 of 1    ----------ANESTHESIA CONSENT----------